# Patient Record
Sex: MALE | Race: WHITE | HISPANIC OR LATINO
[De-identification: names, ages, dates, MRNs, and addresses within clinical notes are randomized per-mention and may not be internally consistent; named-entity substitution may affect disease eponyms.]

---

## 2017-02-22 ENCOUNTER — RESULT REVIEW (OUTPATIENT)
Age: 47
End: 2017-02-22

## 2018-04-10 ENCOUNTER — TRANSCRIPTION ENCOUNTER (OUTPATIENT)
Age: 48
End: 2018-04-10

## 2018-04-11 ENCOUNTER — RECORD ABSTRACTING (OUTPATIENT)
Age: 48
End: 2018-04-11

## 2018-04-11 DIAGNOSIS — I25.2 OLD MYOCARDIAL INFARCTION: ICD-10-CM

## 2018-04-11 DIAGNOSIS — Z87.09 PERSONAL HISTORY OF OTHER DISEASES OF THE RESPIRATORY SYSTEM: ICD-10-CM

## 2018-04-11 DIAGNOSIS — Z78.9 OTHER SPECIFIED HEALTH STATUS: ICD-10-CM

## 2018-04-11 RX ORDER — UBIDECARENONE/VIT E ACET 100MG-5
CAPSULE ORAL
Refills: 0 | Status: ACTIVE | COMMUNITY

## 2018-04-11 RX ORDER — ATORVASTATIN CALCIUM 20 MG/1
20 TABLET, FILM COATED ORAL
Qty: 90 | Refills: 1 | Status: ACTIVE | COMMUNITY

## 2018-04-12 ENCOUNTER — APPOINTMENT (OUTPATIENT)
Dept: CARDIOLOGY | Facility: CLINIC | Age: 48
End: 2018-04-12
Payer: COMMERCIAL

## 2018-04-12 VITALS
BODY MASS INDEX: 34.82 KG/M2 | SYSTOLIC BLOOD PRESSURE: 138 MMHG | WEIGHT: 280 LBS | HEIGHT: 75 IN | HEART RATE: 81 BPM | DIASTOLIC BLOOD PRESSURE: 85 MMHG | RESPIRATION RATE: 18 BRPM

## 2018-04-12 PROCEDURE — 99214 OFFICE O/P EST MOD 30 MIN: CPT

## 2018-04-12 PROCEDURE — 93000 ELECTROCARDIOGRAM COMPLETE: CPT

## 2018-05-01 ENCOUNTER — APPOINTMENT (OUTPATIENT)
Dept: CARDIOLOGY | Facility: CLINIC | Age: 48
End: 2018-05-01
Payer: COMMERCIAL

## 2018-05-01 PROCEDURE — 93015 CV STRESS TEST SUPVJ I&R: CPT

## 2018-05-01 PROCEDURE — 78452 HT MUSCLE IMAGE SPECT MULT: CPT

## 2018-05-01 PROCEDURE — A9500: CPT

## 2018-05-01 RX ORDER — KIT FOR THE PREPARATION OF TECHNETIUM TC99M SESTAMIBI 1 MG/5ML
INJECTION, POWDER, LYOPHILIZED, FOR SOLUTION PARENTERAL
Refills: 0 | Status: COMPLETED | OUTPATIENT
Start: 2018-05-01

## 2018-05-01 RX ORDER — REGADENOSON 0.08 MG/ML
0.4 INJECTION, SOLUTION INTRAVENOUS
Qty: 1 | Refills: 0 | Status: COMPLETED | OUTPATIENT
Start: 2018-05-01

## 2018-05-01 RX ADMIN — REGADENOSON 0 MG/5ML: 0.08 INJECTION, SOLUTION INTRAVENOUS at 00:00

## 2018-05-01 RX ADMIN — KIT FOR THE PREPARATION OF TECHNETIUM TC99M SESTAMIBI 0: 1 INJECTION, POWDER, LYOPHILIZED, FOR SOLUTION PARENTERAL at 00:00

## 2018-05-07 ENCOUNTER — APPOINTMENT (OUTPATIENT)
Dept: CARDIOLOGY | Facility: CLINIC | Age: 48
End: 2018-05-07
Payer: COMMERCIAL

## 2018-05-07 PROCEDURE — ZZZZZ: CPT

## 2018-05-08 ENCOUNTER — OUTPATIENT (OUTPATIENT)
Dept: OUTPATIENT SERVICES | Facility: HOSPITAL | Age: 48
LOS: 1 days | Discharge: HOME | End: 2018-05-08

## 2018-05-08 ENCOUNTER — TRANSCRIPTION ENCOUNTER (OUTPATIENT)
Age: 48
End: 2018-05-08

## 2018-05-08 VITALS
WEIGHT: 285.28 LBS | SYSTOLIC BLOOD PRESSURE: 130 MMHG | TEMPERATURE: 98 F | RESPIRATION RATE: 18 BRPM | OXYGEN SATURATION: 97 % | HEART RATE: 67 BPM | HEIGHT: 75 IN | DIASTOLIC BLOOD PRESSURE: 87 MMHG

## 2018-05-08 DIAGNOSIS — E11.9 TYPE 2 DIABETES MELLITUS WITHOUT COMPLICATIONS: ICD-10-CM

## 2018-05-08 DIAGNOSIS — Z98.890 OTHER SPECIFIED POSTPROCEDURAL STATES: Chronic | ICD-10-CM

## 2018-05-08 DIAGNOSIS — M16.11 UNILATERAL PRIMARY OSTEOARTHRITIS, RIGHT HIP: ICD-10-CM

## 2018-05-08 DIAGNOSIS — I10 ESSENTIAL (PRIMARY) HYPERTENSION: ICD-10-CM

## 2018-05-08 DIAGNOSIS — G47.30 SLEEP APNEA, UNSPECIFIED: ICD-10-CM

## 2018-05-08 DIAGNOSIS — M25.559 PAIN IN UNSPECIFIED HIP: ICD-10-CM

## 2018-05-08 DIAGNOSIS — Z96.642 PRESENCE OF LEFT ARTIFICIAL HIP JOINT: Chronic | ICD-10-CM

## 2018-05-08 DIAGNOSIS — Z01.818 ENCOUNTER FOR OTHER PREPROCEDURAL EXAMINATION: ICD-10-CM

## 2018-05-08 DIAGNOSIS — Z98.84 BARIATRIC SURGERY STATUS: Chronic | ICD-10-CM

## 2018-05-08 DIAGNOSIS — Z96.649 PRESENCE OF UNSPECIFIED ARTIFICIAL HIP JOINT: Chronic | ICD-10-CM

## 2018-05-08 DIAGNOSIS — E78.5 HYPERLIPIDEMIA, UNSPECIFIED: ICD-10-CM

## 2018-05-08 LAB
ANION GAP SERPL CALC-SCNC: 8 MMOL/L — SIGNIFICANT CHANGE UP (ref 5–17)
APTT BLD: 32.9 SEC — SIGNIFICANT CHANGE UP (ref 27.5–37.4)
BASOPHILS # BLD AUTO: 0.05 K/UL — SIGNIFICANT CHANGE UP (ref 0–0.2)
BASOPHILS NFR BLD AUTO: 1 % — SIGNIFICANT CHANGE UP (ref 0–2)
BUN SERPL-MCNC: 14 MG/DL — SIGNIFICANT CHANGE UP (ref 7–23)
CALCIUM SERPL-MCNC: 8.9 MG/DL — SIGNIFICANT CHANGE UP (ref 8.5–10.1)
CHLORIDE SERPL-SCNC: 108 MMOL/L — SIGNIFICANT CHANGE UP (ref 96–108)
CO2 SERPL-SCNC: 25 MMOL/L — SIGNIFICANT CHANGE UP (ref 22–31)
CREAT SERPL-MCNC: 0.61 MG/DL — SIGNIFICANT CHANGE UP (ref 0.5–1.3)
EOSINOPHIL # BLD AUTO: 0.3 K/UL — SIGNIFICANT CHANGE UP (ref 0–0.5)
EOSINOPHIL NFR BLD AUTO: 5.7 % — SIGNIFICANT CHANGE UP (ref 0–6)
GLUCOSE SERPL-MCNC: 117 MG/DL — HIGH (ref 70–99)
HBA1C BLD-MCNC: 6.6 % — HIGH (ref 4–5.6)
HCT VFR BLD CALC: 43 % — SIGNIFICANT CHANGE UP (ref 39–50)
HGB BLD-MCNC: 14.6 G/DL — SIGNIFICANT CHANGE UP (ref 13–17)
IMM GRANULOCYTES NFR BLD AUTO: 0.2 % — SIGNIFICANT CHANGE UP (ref 0–1.5)
INR BLD: 1.1 RATIO — SIGNIFICANT CHANGE UP (ref 0.88–1.16)
LYMPHOCYTES # BLD AUTO: 1.57 K/UL — SIGNIFICANT CHANGE UP (ref 1–3.3)
LYMPHOCYTES # BLD AUTO: 30.1 % — SIGNIFICANT CHANGE UP (ref 13–44)
MCHC RBC-ENTMCNC: 28.3 PG — SIGNIFICANT CHANGE UP (ref 27–34)
MCHC RBC-ENTMCNC: 34 GM/DL — SIGNIFICANT CHANGE UP (ref 32–36)
MCV RBC AUTO: 83.5 FL — SIGNIFICANT CHANGE UP (ref 80–100)
MONOCYTES # BLD AUTO: 0.39 K/UL — SIGNIFICANT CHANGE UP (ref 0–0.9)
MONOCYTES NFR BLD AUTO: 7.5 % — SIGNIFICANT CHANGE UP (ref 2–14)
MRSA PCR RESULT.: SIGNIFICANT CHANGE UP
NEUTROPHILS # BLD AUTO: 2.9 K/UL — SIGNIFICANT CHANGE UP (ref 1.8–7.4)
NEUTROPHILS NFR BLD AUTO: 55.5 % — SIGNIFICANT CHANGE UP (ref 43–77)
PLATELET # BLD AUTO: 272 K/UL — SIGNIFICANT CHANGE UP (ref 150–400)
POTASSIUM SERPL-MCNC: 3.7 MMOL/L — SIGNIFICANT CHANGE UP (ref 3.5–5.3)
POTASSIUM SERPL-SCNC: 3.7 MMOL/L — SIGNIFICANT CHANGE UP (ref 3.5–5.3)
PROTHROM AB SERPL-ACNC: 12 SEC — SIGNIFICANT CHANGE UP (ref 9.8–12.7)
RBC # BLD: 5.15 M/UL — SIGNIFICANT CHANGE UP (ref 4.2–5.8)
RBC # FLD: 13.4 % — SIGNIFICANT CHANGE UP (ref 10.3–14.5)
S AUREUS DNA NOSE QL NAA+PROBE: SIGNIFICANT CHANGE UP
SODIUM SERPL-SCNC: 141 MMOL/L — SIGNIFICANT CHANGE UP (ref 135–145)
WBC # BLD: 5.22 K/UL — SIGNIFICANT CHANGE UP (ref 3.8–10.5)
WBC # FLD AUTO: 5.22 K/UL — SIGNIFICANT CHANGE UP (ref 3.8–10.5)

## 2018-05-08 NOTE — OCCUPATIONAL THERAPY INITIAL EVALUATION ADULT - ADDITIONAL COMMENTS
Pt is functioning in his  roles, self sufficient, driving  & ambulating independently without any assistive devices. Pt c/o  6/10 in  right hip and this increases with movements, and standing tasks. Pt takes Aleve PRN  for pain relief. Pt has no DME at home. Pt scores 90% of patient specific scale

## 2018-05-08 NOTE — PHYSICAL THERAPY INITIAL EVALUATION ADULT - CRITERIA FOR SKILLED THERAPEUTIC INTERVENTIONS
risk reduction/prevention/:,:,:/anticipated discharge recommendation/rehab potential/impairments found/functional limitations in following categories/anticipated equipment needs at discharge

## 2018-05-08 NOTE — OCCUPATIONAL THERAPY INITIAL EVALUATION ADULT - SOCIAL CONCERNS
Complex psychosocial needs/coping issues/Pt voiced concerns about his recovery at home. Pt  plans to sat at his girlfriend's home . Pt has the support of his girlfriend  to assist him after discharge home post-operatively.

## 2018-05-08 NOTE — PHYSICAL THERAPY INITIAL EVALUATION ADULT - PLANNED THERAPY INTERVENTIONS, PT EVAL
neuromuscular re-education/postural re-education/strengthening/transfer training/balance training/stretching/gait training/manual therapy techniques/ROM

## 2018-05-08 NOTE — OCCUPATIONAL THERAPY INITIAL EVALUATION ADULT - RANGE OF MOTION EXAMINATION, LOWER EXTREMITY
Left LE Active ROM was WNL  (within normal limits)/Right LE Active Assistive ROM was WFL  (within functional limits)/Left LE Passive ROM was WNL (within normal limits)/Right LE Passive ROM was WFL  (within functional limits)

## 2018-05-08 NOTE — H&P PST ADULT - PMH
Diabetes    HTN (hypertension)    Hyperlipidemia Diabetes    HTN (hypertension)    Hyperlipidemia    MI, old  1998  Sleep apnea  USES CPAP

## 2018-05-08 NOTE — PATIENT PROFILE ADULT. - CENTRAL VENOUS CATHETER
Subjective:      Roxy Salinas is a 47 y.o. female who presents for an acute visit with the following chief complaint. Chief Complaint   Patient presents with    Cold Symptoms     diarrhea, productive cough, sore throat X 4 days      Onset Saturday     Upper Respiratory Infection  Patient complains of symptoms of diarrhea, productive cough, sore throat. Onset 4 days ago, symptoms unchanged since this time. Associated symptoms include malaise, nasal congestion. Cough is occasionally productive with clear sputum. She denies SOB or wheezing. She has a history of asthma but has not needed her albuterol inhaler. She is drinking plenty of fluids and tolerating PO intake. Evaluation to date: none. Treatment to date: OTC decongestants, cough suppressants, hot tea. Sick contacts include co-workers. Current Outpatient Prescriptions   Medication Sig Dispense Refill    amLODIPine (NORVASC) 5 mg tablet Take 1 Tab by mouth daily. 90 Tab 0    predniSONE (DELTASONE) 20 mg tablet Take 1 Tab by mouth daily (with breakfast). 20 Tab 0    CYANOCOBALAMIN, VITAMIN B-12, (LIQUID B 12 PO) Take  by mouth.  zinc 15 mg tab Take 15 mg by mouth daily. 90 Tab 3    cetirizine (ALLER-FERNANDA) 10 mg tablet Take 1 Tab by mouth daily. 90 Tab 1    acetaminophen (TYLENOL) 325 mg tablet Take  by mouth every four (4) hours as needed for Pain.  ibuprofen (MOTRIN) 800 mg tablet Take 1 Tab by mouth every eight (8) hours as needed for Pain. 30 Tab 0    coenzyme q10 (CO Q-10) 10 mg cap Take  by mouth.  fluticasone-vilanterol (BREO ELLIPTA) 100-25 mcg/dose inhaler Take 1 Puff by inhalation daily. 1 Inhaler 5    albuterol (PROVENTIL HFA, VENTOLIN HFA, PROAIR HFA) 90 mcg/actuation inhaler Take 1-2 Puffs by inhalation every four (4) hours as needed for Wheezing or Shortness of Breath. 1 Inhaler 2    omeprazole (PRILOSEC OTC) 20 mg tablet Take 20 mg by mouth daily.       EPIPEN 2-WAYNE 0.3 mg/0.3 mL (1:1,000) injection 0.3 mL by IntraMUSCular route once as needed for up to 1 dose. 2 Each 3    aspirin 81 mg tablet Take 81 mg by mouth daily.  ascorbic acid (VITAMIN C) 500 mg tablet Take 500 mg by mouth daily.  Cholecalciferol, Vitamin D3, (VITAMIN D3) 1,000 unit Cap Take 1 capsule by mouth daily.  multivitamin (ONE A DAY) tablet Take 1 Tab by mouth daily. Allergies   Allergen Reactions    Latex Swelling     Swelling in face when latex gloves were used at dentist office    Other Food Itching     Peas, raw broccoli, raw cauliflower, croutons    Hydrocodone-Acetaminophen Itching    Ultracet [Tramadol-Acetaminophen] Swelling     Facial and mouth    Biaxin [Clarithromycin] Itching and Nausea Only    Carrot Itching    Codeine Itching    Dilaudid [Hydromorphone] Itching and Nausea Only    Flexeril [Cyclobenzaprine] Itching    Naproxen Rash    Nucynta [Tapentadol] Itching    Oxycodone Swelling     Swelling of hands    Pcn [Penicillins] Rash    Peanut Shortness of Breath and Swelling     All nuts    Red Dye Itching and Swelling     #9    Shellfish Derived Swelling     shrimp    Strawberry Itching    Tramadol Hives       ROS:   Complete review of systems was reviewed with pertinent information listed in HPI. Review of Systems   Constitutional: Positive for malaise/fatigue. Negative for chills, diaphoresis and fever. HENT: Positive for congestion and sore throat. Negative for sinus pain. Respiratory: Positive for cough and sputum production. Negative for hemoptysis, shortness of breath and wheezing. Cardiovascular: Negative for chest pain. Gastrointestinal: Positive for diarrhea. Negative for abdominal pain, nausea and vomiting. Able to eat and drink. Poor appetitie   Genitourinary: Negative for dysuria. Musculoskeletal: Negative for joint pain and myalgias. Skin: Negative for rash. Neurological: Negative for dizziness and headaches.        Objective:     Visit Vitals    /85 (BP 1 Location: Left arm, BP Patient Position: Sitting)    Pulse 85    Temp 98.5 °F (36.9 °C) (Oral)    Resp 18    Ht 5' 3\" (1.6 m)    Wt 242 lb (109.8 kg)    SpO2 97%    BMI 42.87 kg/m2       Vitals and Nurse Documentation reviewed. Physical Exam   Constitutional: She is oriented to person, place, and time and well-developed, well-nourished, and in no distress. She does not have a sickly appearance. No distress. HENT:   Head: Normocephalic and atraumatic. Right Ear: Tympanic membrane is not erythematous and not bulging. No decreased hearing is noted. Left Ear: Tympanic membrane is not erythematous and not bulging. No decreased hearing is noted. Nose: Mucosal edema and rhinorrhea present. Right sinus exhibits no maxillary sinus tenderness and no frontal sinus tenderness. Left sinus exhibits no maxillary sinus tenderness and no frontal sinus tenderness. Mouth/Throat: Uvula is midline. Posterior oropharyngeal erythema present. No oropharyngeal exudate, posterior oropharyngeal edema or tonsillar abscesses. Eyes: Conjunctivae and lids are normal. Pupils are equal, round, and reactive to light. Right conjunctiva is not injected. Left conjunctiva is not injected. Neck: Normal range of motion. Neck supple. No tracheal deviation present. No thyroid mass and no thyromegaly present. Cardiovascular: Normal rate, regular rhythm, S1 normal, S2 normal, normal heart sounds and intact distal pulses. Exam reveals no gallop and no friction rub. No murmur heard. Pulmonary/Chest: Effort normal and breath sounds normal. No respiratory distress. She has no wheezes. She has no rales. Abdominal: Soft. Bowel sounds are normal. She exhibits no distension. There is no tenderness. Lymphadenopathy:     She has no cervical adenopathy. Neurological: She is alert and oriented to person, place, and time. Gait normal.   Skin: Skin is warm and dry. She is not diaphoretic. No erythema.    Psychiatric: Mood and affect normal.   Nursing note and vitals reviewed. Assessment/Plan:     Diagnoses and all orders for this visit:    1. Viral URI with cough: Resolving, day 4 of URI symptoms without SOB or wheezing. Continue with symptomatic care including rest, lots of po fluids, Mucinex, OTC cough suppressant, tylenol or ibuprofen for sore throat. We discussed anticipated course of viral URI and she will call or return to clinic if she does not have improvement in 3-5 days or if symptoms worsen. She is due for follow-up with PCP Dr. Demetrice Sanz. I agree with the above documentation and the patient was seen in conjunction with the NP. Amy Carson NP      Discussed expected course/resolution/complications of diagnosis in detail with patient.    Medication risks/benefits/costs/interactions/alternatives discussed with patient.    Pt was given an after visit summary which includes diagnoses, current medications & vitals.    Pt expressed understanding with the diagnosis and plan    Follow-up Disposition:  Return for Routine Physical Exam-schedule. no

## 2018-05-08 NOTE — PHYSICAL THERAPY INITIAL EVALUATION ADULT - MODIFIED CLINICAL TEST OF SENSORY INTEGRATION IN BALANCE TEST
5x Sit to Stand Test = unable due to pain and weakness, indicating significant impairment c functional mobility & strength  ; 2 Minute Walk Test = 360 feet without devices or rest stops, Pain rating 5/10, measured for baseline recording

## 2018-05-08 NOTE — H&P PST ADULT - PSH
S/P gastric bypass  2004  S/P hip replacement, left  2014  S/P lumbar laminectomy  2000 S/P gastric bypass  2004  S/P hip hemiarthroplasty  left hip pins then removal of pins  S/P hip replacement, left  2014  S/P lumbar laminectomy  2000

## 2018-05-08 NOTE — OCCUPATIONAL THERAPY INITIAL EVALUATION ADULT - COORDINATION ASSESSED, REHAB EVAL
intact in BUE/LLE, diminished in RLE/heel to shin/finger to nose intact in BUE/LLE, diminished in RLE/finger to nose/heel to shin

## 2018-05-08 NOTE — PHYSICAL THERAPY INITIAL EVALUATION ADULT - GAIT DEVIATIONS NOTED, PT EVAL
footdrop/decreased elodia/decreased step length/decreased stride length/mild right foot drop noted with increased walking distance; (+) limp to right

## 2018-05-08 NOTE — PHYSICAL THERAPY INITIAL EVALUATION ADULT - PERTINENT HX OF CURRENT PROBLEM, REHAB EVAL
Patient is for elective right total hip arthroplasty (likely posterior approach) at a later date from now. Significant history of spinal laminectomy in 2000 - resulting to mild right foot drop and sensation around right ankle.

## 2018-05-08 NOTE — OCCUPATIONAL THERAPY INITIAL EVALUATION ADULT - PRECAUTIONS/LIMITATIONS, REHAB EVAL
fall precautions/Pt has a history of multiple comorbidities. Pt has  right foot drop  from s/p  laminectomy  in  2000

## 2018-05-08 NOTE — PHYSICAL THERAPY INITIAL EVALUATION ADULT - ADDITIONAL COMMENTS
Per patient, will be staying at girlfriend's home post-op. No stairs to enter, all amenities in one level. Girlfriend will be available for support post-op. Girlfriend's home has a shower/tub combo bathroom with regular height toilet seat. Pain aggravating factors include sudden turns and movements, woken sometimes with pain to hip; standing for long periods. Pain easing factors include resting, Alleve. Denies use of mobility devices prior to admission, other than right foot AFO (not worn now as poorly fitting). No adaptive devices at home.

## 2018-05-14 ENCOUNTER — APPOINTMENT (OUTPATIENT)
Dept: CARDIOLOGY | Facility: CLINIC | Age: 48
End: 2018-05-14
Payer: COMMERCIAL

## 2018-05-14 PROCEDURE — 93306 TTE W/DOPPLER COMPLETE: CPT

## 2018-05-22 ENCOUNTER — TRANSCRIPTION ENCOUNTER (OUTPATIENT)
Age: 48
End: 2018-05-22

## 2018-05-22 RX ORDER — SODIUM CHLORIDE 9 MG/ML
3 INJECTION INTRAMUSCULAR; INTRAVENOUS; SUBCUTANEOUS EVERY 8 HOURS
Qty: 0 | Refills: 0 | Status: DISCONTINUED | OUTPATIENT
Start: 2018-05-23 | End: 2018-05-23

## 2018-05-22 RX ORDER — OXYCODONE HYDROCHLORIDE 5 MG/1
10 TABLET ORAL ONCE
Qty: 0 | Refills: 0 | Status: DISCONTINUED | OUTPATIENT
Start: 2018-05-23 | End: 2018-05-23

## 2018-05-22 RX ORDER — ACETAMINOPHEN 500 MG
650 TABLET ORAL ONCE
Qty: 0 | Refills: 0 | Status: COMPLETED | OUTPATIENT
Start: 2018-05-23 | End: 2018-05-23

## 2018-05-23 ENCOUNTER — TRANSCRIPTION ENCOUNTER (OUTPATIENT)
Age: 48
End: 2018-05-23

## 2018-05-23 ENCOUNTER — RESULT REVIEW (OUTPATIENT)
Age: 48
End: 2018-05-23

## 2018-05-23 ENCOUNTER — INPATIENT (INPATIENT)
Facility: HOSPITAL | Age: 48
LOS: 0 days | Discharge: HOME HEALTH | End: 2018-05-24
Attending: ORTHOPAEDIC SURGERY | Admitting: ORTHOPAEDIC SURGERY
Payer: COMMERCIAL

## 2018-05-23 VITALS
RESPIRATION RATE: 18 BRPM | WEIGHT: 279.99 LBS | HEART RATE: 74 BPM | DIASTOLIC BLOOD PRESSURE: 87 MMHG | SYSTOLIC BLOOD PRESSURE: 133 MMHG | HEIGHT: 75 IN | TEMPERATURE: 98 F

## 2018-05-23 DIAGNOSIS — Z98.84 BARIATRIC SURGERY STATUS: Chronic | ICD-10-CM

## 2018-05-23 DIAGNOSIS — I25.10 ATHEROSCLEROTIC HEART DISEASE OF NATIVE CORONARY ARTERY WITHOUT ANGINA PECTORIS: ICD-10-CM

## 2018-05-23 DIAGNOSIS — Z96.649 PRESENCE OF UNSPECIFIED ARTIFICIAL HIP JOINT: Chronic | ICD-10-CM

## 2018-05-23 DIAGNOSIS — G47.33 OBSTRUCTIVE SLEEP APNEA (ADULT) (PEDIATRIC): ICD-10-CM

## 2018-05-23 DIAGNOSIS — I10 ESSENTIAL (PRIMARY) HYPERTENSION: ICD-10-CM

## 2018-05-23 DIAGNOSIS — Z96.642 PRESENCE OF LEFT ARTIFICIAL HIP JOINT: Chronic | ICD-10-CM

## 2018-05-23 DIAGNOSIS — E11.65 TYPE 2 DIABETES MELLITUS WITH HYPERGLYCEMIA: ICD-10-CM

## 2018-05-23 DIAGNOSIS — Z98.890 OTHER SPECIFIED POSTPROCEDURAL STATES: Chronic | ICD-10-CM

## 2018-05-23 DIAGNOSIS — M16.11 UNILATERAL PRIMARY OSTEOARTHRITIS, RIGHT HIP: ICD-10-CM

## 2018-05-23 LAB
ANION GAP SERPL CALC-SCNC: 3 MMOL/L — LOW (ref 5–17)
BUN SERPL-MCNC: 16 MG/DL — SIGNIFICANT CHANGE UP (ref 7–23)
CALCIUM SERPL-MCNC: 8.2 MG/DL — LOW (ref 8.5–10.1)
CHLORIDE SERPL-SCNC: 108 MMOL/L — SIGNIFICANT CHANGE UP (ref 96–108)
CO2 SERPL-SCNC: 27 MMOL/L — SIGNIFICANT CHANGE UP (ref 22–31)
CREAT SERPL-MCNC: 0.85 MG/DL — SIGNIFICANT CHANGE UP (ref 0.5–1.3)
GLUCOSE BLDC GLUCOMTR-MCNC: 188 MG/DL — HIGH (ref 70–99)
GLUCOSE SERPL-MCNC: 151 MG/DL — HIGH (ref 70–99)
HCT VFR BLD CALC: 39.8 % — SIGNIFICANT CHANGE UP (ref 39–50)
HGB BLD-MCNC: 13.4 G/DL — SIGNIFICANT CHANGE UP (ref 13–17)
MCHC RBC-ENTMCNC: 29.1 PG — SIGNIFICANT CHANGE UP (ref 27–34)
MCHC RBC-ENTMCNC: 33.7 GM/DL — SIGNIFICANT CHANGE UP (ref 32–36)
MCV RBC AUTO: 86.3 FL — SIGNIFICANT CHANGE UP (ref 80–100)
NRBC # BLD: 0 /100 WBCS — SIGNIFICANT CHANGE UP (ref 0–0)
PLATELET # BLD AUTO: 217 K/UL — SIGNIFICANT CHANGE UP (ref 150–400)
POTASSIUM SERPL-MCNC: 5.2 MMOL/L — SIGNIFICANT CHANGE UP (ref 3.5–5.3)
POTASSIUM SERPL-SCNC: 5.2 MMOL/L — SIGNIFICANT CHANGE UP (ref 3.5–5.3)
RBC # BLD: 4.61 M/UL — SIGNIFICANT CHANGE UP (ref 4.2–5.8)
RBC # FLD: 13.3 % — SIGNIFICANT CHANGE UP (ref 10.3–14.5)
SODIUM SERPL-SCNC: 138 MMOL/L — SIGNIFICANT CHANGE UP (ref 135–145)
WBC # BLD: 12.96 K/UL — HIGH (ref 3.8–10.5)
WBC # FLD AUTO: 12.96 K/UL — HIGH (ref 3.8–10.5)

## 2018-05-23 PROCEDURE — 72170 X-RAY EXAM OF PELVIS: CPT | Mod: 26

## 2018-05-23 PROCEDURE — 88311 DECALCIFY TISSUE: CPT | Mod: 26

## 2018-05-23 PROCEDURE — 88305 TISSUE EXAM BY PATHOLOGIST: CPT | Mod: 26

## 2018-05-23 PROCEDURE — 99223 1ST HOSP IP/OBS HIGH 75: CPT

## 2018-05-23 RX ORDER — OXYCODONE HYDROCHLORIDE 5 MG/1
5 TABLET ORAL EVERY 4 HOURS
Qty: 0 | Refills: 0 | Status: DISCONTINUED | OUTPATIENT
Start: 2018-05-23 | End: 2018-05-24

## 2018-05-23 RX ORDER — CELECOXIB 200 MG/1
200 CAPSULE ORAL
Qty: 0 | Refills: 0 | Status: DISCONTINUED | OUTPATIENT
Start: 2018-05-24 | End: 2018-05-24

## 2018-05-23 RX ORDER — CEFAZOLIN SODIUM 1 G
3000 VIAL (EA) INJECTION EVERY 8 HOURS
Qty: 0 | Refills: 0 | Status: COMPLETED | OUTPATIENT
Start: 2018-05-23 | End: 2018-05-23

## 2018-05-23 RX ORDER — AMLODIPINE BESYLATE 2.5 MG/1
5 TABLET ORAL DAILY
Qty: 0 | Refills: 0 | Status: DISCONTINUED | OUTPATIENT
Start: 2018-05-23 | End: 2018-05-23

## 2018-05-23 RX ORDER — HYDROMORPHONE HYDROCHLORIDE 2 MG/ML
1 INJECTION INTRAMUSCULAR; INTRAVENOUS; SUBCUTANEOUS
Qty: 0 | Refills: 0 | Status: DISCONTINUED | OUTPATIENT
Start: 2018-05-23 | End: 2018-05-23

## 2018-05-23 RX ORDER — ATORVASTATIN CALCIUM 80 MG/1
1 TABLET, FILM COATED ORAL
Qty: 0 | Refills: 0 | COMMUNITY

## 2018-05-23 RX ORDER — DOCUSATE SODIUM 100 MG
100 CAPSULE ORAL THREE TIMES A DAY
Qty: 0 | Refills: 0 | Status: DISCONTINUED | OUTPATIENT
Start: 2018-05-23 | End: 2018-05-24

## 2018-05-23 RX ORDER — AMLODIPINE BESYLATE 2.5 MG/1
1 TABLET ORAL
Qty: 0 | Refills: 0 | COMMUNITY

## 2018-05-23 RX ORDER — ZOLPIDEM TARTRATE 10 MG/1
5 TABLET ORAL AT BEDTIME
Qty: 0 | Refills: 0 | Status: DISCONTINUED | OUTPATIENT
Start: 2018-05-23 | End: 2018-05-24

## 2018-05-23 RX ORDER — VITAMIN E 100 UNIT
1 CAPSULE ORAL
Qty: 0 | Refills: 0 | COMMUNITY

## 2018-05-23 RX ORDER — ONDANSETRON 8 MG/1
4 TABLET, FILM COATED ORAL EVERY 6 HOURS
Qty: 0 | Refills: 0 | Status: DISCONTINUED | OUTPATIENT
Start: 2018-05-23 | End: 2018-05-24

## 2018-05-23 RX ORDER — METFORMIN HYDROCHLORIDE 850 MG/1
1 TABLET ORAL
Qty: 0 | Refills: 0 | COMMUNITY

## 2018-05-23 RX ORDER — DIPHENHYDRAMINE HCL 50 MG
25 CAPSULE ORAL AT BEDTIME
Qty: 0 | Refills: 0 | Status: DISCONTINUED | OUTPATIENT
Start: 2018-05-23 | End: 2018-05-24

## 2018-05-23 RX ORDER — ACETAMINOPHEN 500 MG
975 TABLET ORAL EVERY 8 HOURS
Qty: 0 | Refills: 0 | Status: COMPLETED | OUTPATIENT
Start: 2018-05-23 | End: 2018-05-24

## 2018-05-23 RX ORDER — FERROUS SULFATE 325(65) MG
325 TABLET ORAL
Qty: 0 | Refills: 0 | Status: DISCONTINUED | OUTPATIENT
Start: 2018-05-23 | End: 2018-05-24

## 2018-05-23 RX ORDER — CELECOXIB 200 MG/1
200 CAPSULE ORAL ONCE
Qty: 0 | Refills: 0 | Status: COMPLETED | OUTPATIENT
Start: 2018-05-23 | End: 2018-05-23

## 2018-05-23 RX ORDER — HYDROMORPHONE HYDROCHLORIDE 2 MG/ML
1 INJECTION INTRAMUSCULAR; INTRAVENOUS; SUBCUTANEOUS EVERY 4 HOURS
Qty: 0 | Refills: 0 | Status: DISCONTINUED | OUTPATIENT
Start: 2018-05-23 | End: 2018-05-24

## 2018-05-23 RX ORDER — LISINOPRIL 2.5 MG/1
1 TABLET ORAL
Qty: 0 | Refills: 0 | COMMUNITY

## 2018-05-23 RX ORDER — ASPIRIN/CALCIUM CARB/MAGNESIUM 324 MG
325 TABLET ORAL
Qty: 0 | Refills: 0 | Status: DISCONTINUED | OUTPATIENT
Start: 2018-05-23 | End: 2018-05-24

## 2018-05-23 RX ORDER — METFORMIN HYDROCHLORIDE 850 MG/1
500 TABLET ORAL DAILY
Qty: 0 | Refills: 0 | Status: DISCONTINUED | OUTPATIENT
Start: 2018-05-23 | End: 2018-05-23

## 2018-05-23 RX ORDER — ASCORBIC ACID 60 MG
500 TABLET,CHEWABLE ORAL
Qty: 0 | Refills: 0 | Status: DISCONTINUED | OUTPATIENT
Start: 2018-05-23 | End: 2018-05-24

## 2018-05-23 RX ORDER — TRANEXAMIC ACID 100 MG/ML
1000 INJECTION, SOLUTION INTRAVENOUS ONCE
Qty: 0 | Refills: 0 | Status: COMPLETED | OUTPATIENT
Start: 2018-05-23 | End: 2018-05-23

## 2018-05-23 RX ORDER — DEXAMETHASONE 0.5 MG/5ML
10 ELIXIR ORAL ONCE
Qty: 0 | Refills: 0 | Status: COMPLETED | OUTPATIENT
Start: 2018-05-24 | End: 2018-05-24

## 2018-05-23 RX ORDER — SODIUM CHLORIDE 9 MG/ML
1000 INJECTION, SOLUTION INTRAVENOUS
Qty: 0 | Refills: 0 | Status: DISCONTINUED | OUTPATIENT
Start: 2018-05-23 | End: 2018-05-23

## 2018-05-23 RX ORDER — LISINOPRIL 2.5 MG/1
10 TABLET ORAL DAILY
Qty: 0 | Refills: 0 | Status: DISCONTINUED | OUTPATIENT
Start: 2018-05-23 | End: 2018-05-23

## 2018-05-23 RX ORDER — CETIRIZINE HYDROCHLORIDE 10 MG/1
1 TABLET ORAL
Qty: 0 | Refills: 0 | COMMUNITY

## 2018-05-23 RX ORDER — LORATADINE 10 MG/1
10 TABLET ORAL DAILY
Qty: 0 | Refills: 0 | Status: DISCONTINUED | OUTPATIENT
Start: 2018-05-23 | End: 2018-05-23

## 2018-05-23 RX ORDER — PANTOPRAZOLE SODIUM 20 MG/1
40 TABLET, DELAYED RELEASE ORAL DAILY
Qty: 0 | Refills: 0 | Status: DISCONTINUED | OUTPATIENT
Start: 2018-05-23 | End: 2018-05-24

## 2018-05-23 RX ORDER — SODIUM CHLORIDE 9 MG/ML
1000 INJECTION, SOLUTION INTRAVENOUS
Qty: 0 | Refills: 0 | Status: DISCONTINUED | OUTPATIENT
Start: 2018-05-23 | End: 2018-05-24

## 2018-05-23 RX ORDER — ATORVASTATIN CALCIUM 80 MG/1
20 TABLET, FILM COATED ORAL AT BEDTIME
Qty: 0 | Refills: 0 | Status: DISCONTINUED | OUTPATIENT
Start: 2018-05-23 | End: 2018-05-23

## 2018-05-23 RX ORDER — OMEGA-3 ACID ETHYL ESTERS 1 G
1 CAPSULE ORAL
Qty: 0 | Refills: 0 | COMMUNITY

## 2018-05-23 RX ORDER — POLYETHYLENE GLYCOL 3350 17 G/17G
17 POWDER, FOR SOLUTION ORAL DAILY
Qty: 0 | Refills: 0 | Status: DISCONTINUED | OUTPATIENT
Start: 2018-05-23 | End: 2018-05-24

## 2018-05-23 RX ORDER — SENNA PLUS 8.6 MG/1
2 TABLET ORAL AT BEDTIME
Qty: 0 | Refills: 0 | Status: DISCONTINUED | OUTPATIENT
Start: 2018-05-23 | End: 2018-05-24

## 2018-05-23 RX ORDER — FOLIC ACID 0.8 MG
1 TABLET ORAL DAILY
Qty: 0 | Refills: 0 | Status: DISCONTINUED | OUTPATIENT
Start: 2018-05-23 | End: 2018-05-24

## 2018-05-23 RX ORDER — ACETAMINOPHEN 500 MG
975 TABLET ORAL EVERY 8 HOURS
Qty: 0 | Refills: 0 | Status: DISCONTINUED | OUTPATIENT
Start: 2018-05-23 | End: 2018-05-23

## 2018-05-23 RX ORDER — ACETAMINOPHEN 500 MG
1000 TABLET ORAL ONCE
Qty: 0 | Refills: 0 | Status: COMPLETED | OUTPATIENT
Start: 2018-05-23 | End: 2018-05-23

## 2018-05-23 RX ORDER — PREGABALIN 225 MG/1
1 CAPSULE ORAL
Qty: 0 | Refills: 0 | COMMUNITY

## 2018-05-23 RX ORDER — OXYCODONE HYDROCHLORIDE 5 MG/1
10 TABLET ORAL EVERY 4 HOURS
Qty: 0 | Refills: 0 | Status: DISCONTINUED | OUTPATIENT
Start: 2018-05-23 | End: 2018-05-24

## 2018-05-23 RX ADMIN — Medication 1 TABLET(S): at 16:28

## 2018-05-23 RX ADMIN — HYDROMORPHONE HYDROCHLORIDE 1 MILLIGRAM(S): 2 INJECTION INTRAMUSCULAR; INTRAVENOUS; SUBCUTANEOUS at 21:45

## 2018-05-23 RX ADMIN — Medication 200 MILLIGRAM(S): at 23:28

## 2018-05-23 RX ADMIN — PANTOPRAZOLE SODIUM 40 MILLIGRAM(S): 20 TABLET, DELAYED RELEASE ORAL at 16:28

## 2018-05-23 RX ADMIN — CELECOXIB 200 MILLIGRAM(S): 200 CAPSULE ORAL at 06:50

## 2018-05-23 RX ADMIN — HYDROMORPHONE HYDROCHLORIDE 1 MILLIGRAM(S): 2 INJECTION INTRAMUSCULAR; INTRAVENOUS; SUBCUTANEOUS at 21:30

## 2018-05-23 RX ADMIN — Medication 1000 MILLIGRAM(S): at 09:56

## 2018-05-23 RX ADMIN — SODIUM CHLORIDE 100 MILLILITER(S): 9 INJECTION, SOLUTION INTRAVENOUS at 09:56

## 2018-05-23 RX ADMIN — OXYCODONE HYDROCHLORIDE 10 MILLIGRAM(S): 5 TABLET ORAL at 06:50

## 2018-05-23 RX ADMIN — SODIUM CHLORIDE 3 MILLILITER(S): 9 INJECTION INTRAMUSCULAR; INTRAVENOUS; SUBCUTANEOUS at 06:37

## 2018-05-23 RX ADMIN — Medication 200 MILLIGRAM(S): at 16:30

## 2018-05-23 RX ADMIN — Medication 975 MILLIGRAM(S): at 21:30

## 2018-05-23 RX ADMIN — Medication 100 MILLIGRAM(S): at 21:30

## 2018-05-23 RX ADMIN — TRANEXAMIC ACID 220 MILLIGRAM(S): 100 INJECTION, SOLUTION INTRAVENOUS at 10:22

## 2018-05-23 RX ADMIN — OXYCODONE HYDROCHLORIDE 5 MILLIGRAM(S): 5 TABLET ORAL at 19:40

## 2018-05-23 RX ADMIN — SODIUM CHLORIDE 120 MILLILITER(S): 9 INJECTION, SOLUTION INTRAVENOUS at 21:30

## 2018-05-23 RX ADMIN — Medication 325 MILLIGRAM(S): at 17:21

## 2018-05-23 RX ADMIN — Medication 325 MILLIGRAM(S): at 16:29

## 2018-05-23 RX ADMIN — Medication 650 MILLIGRAM(S): at 06:50

## 2018-05-23 RX ADMIN — Medication 100 MILLIGRAM(S): at 16:37

## 2018-05-23 RX ADMIN — Medication 400 MILLIGRAM(S): at 09:55

## 2018-05-23 RX ADMIN — OXYCODONE HYDROCHLORIDE 5 MILLIGRAM(S): 5 TABLET ORAL at 22:30

## 2018-05-23 RX ADMIN — HYDROMORPHONE HYDROCHLORIDE 1 MILLIGRAM(S): 2 INJECTION INTRAMUSCULAR; INTRAVENOUS; SUBCUTANEOUS at 17:20

## 2018-05-23 RX ADMIN — OXYCODONE HYDROCHLORIDE 5 MILLIGRAM(S): 5 TABLET ORAL at 19:05

## 2018-05-23 RX ADMIN — Medication 975 MILLIGRAM(S): at 16:36

## 2018-05-23 RX ADMIN — Medication 1 MILLIGRAM(S): at 16:29

## 2018-05-23 RX ADMIN — OXYCODONE HYDROCHLORIDE 5 MILLIGRAM(S): 5 TABLET ORAL at 21:30

## 2018-05-23 RX ADMIN — Medication 500 MILLIGRAM(S): at 17:23

## 2018-05-23 RX ADMIN — HYDROMORPHONE HYDROCHLORIDE 1 MILLIGRAM(S): 2 INJECTION INTRAMUSCULAR; INTRAVENOUS; SUBCUTANEOUS at 16:27

## 2018-05-23 NOTE — PHYSICAL THERAPY INITIAL EVALUATION ADULT - GENERAL OBSERVATIONS, REHAB EVAL
Pt seen supine in bed, alert and Ox4, NAD, R hip c dressing intact, nasal cannula intact. Pt /73, HR 73, O2 on room air 96% on room air. Pt instructed on R posterior hip precautions, verbalized and abided by throughout

## 2018-05-23 NOTE — DISCHARGE NOTE ADULT - PAIN PRESENT
No Take your medications exactly as prescribed. Having your pain under control will help increase activity, improve deep breathing and coughing and prevent complications like pneumonia and blood clots in your legs. Some of the common side effects of pain medications are nausea, vomiting, itching, rash and upset stomach. Contact your doctor if you develop these or any other unusual systoms. Eat a diet rich in fiber and drink plenty of oral fluids. Use other pain management methods like, cold and warm applications, elevation of affected body part, listening to music, watching TV, yoga, etc.Do not take any other medications unless approved by your doctor. Do not drive, operate machinery, drink alcohol, or make any important decisions while taking narcotic pain medications. Store medication in its original bottle, in a locked cabinet away from the reach of children. Dispose of any unused and  medication safely./No

## 2018-05-23 NOTE — OCCUPATIONAL THERAPY INITIAL EVALUATION ADULT - ADDITIONAL COMMENTS
Per pre surgical testing, will be staying at girlfriend's home post-op. No stairs to enter, all amenities in one level. Girlfriend will be available for support post-op. Girlfriend's home has a shower/tub combo bathroom with regular height toilet seat. Denies use of mobility devices prior to admission, other than right foot AFO (not worn now as poorly fitting). No adaptive devices at home.    Pt performed lower body dressing with supervision with rolling walker (doffing/donning socks/shoes, and donning shorts, with use of reacher, long handled shoe horn, and sock aid. Pt performed functional mobility to bathroom toilet with rolling walker with supervision, performed toileting with supervision and returned to hip chair, demonstrating good sit to stand and stand to sit technique while maintaining posterior hip precautions.  Pt educated on hip precautions, demonstrating good understanding and good ADL management.

## 2018-05-23 NOTE — CONSULT NOTE ADULT - PROBLEM SELECTOR RECOMMENDATION 9
cont with pain management and bowel regimen  OT/PT  dvt ppx as per ortho  monitor h/h  incentive spirometer  zofran prn for n/v

## 2018-05-23 NOTE — ASU PREOP CHECKLIST - SELECT TESTS ORDERED
EKG/CBC/BMP/PT/PTT/INR/Type and Screen INR/PT/PTT/EKG/188/POCT Blood Glucose/BMP/CBC/Type and Screen

## 2018-05-23 NOTE — CONSULT NOTE ADULT - ASSESSMENT
48 yo with HTN, HLD , CAD (s/p MI 1998), SALVADOR (uses cpap), DM (type 2) obesity (s/p gastric bypass, 2004), OA s/p right TAMI.

## 2018-05-23 NOTE — BRIEF OPERATIVE NOTE - PROCEDURE
<<-----Click on this checkbox to enter Procedure Right total hip replacement  05/23/2018  posterior approach  Active  [id number]

## 2018-05-23 NOTE — DISCHARGE NOTE ADULT - NS AS ACTIVITY OBS
Do not drive or operate machinery/Walking-Indoors allowed/No Heavy lifting/straining/Walking-Outdoors allowed/Stairs allowed/Showering allowed

## 2018-05-23 NOTE — DISCHARGE NOTE ADULT - HOSPITAL COURSE
47yMale with history of Right Hip Pain presenting for Right TAMI by Dr. Lubin on 5/23/18. Risk and benefits of surgery were explained to the patient. The patient understood and agreed to proceed with surgery. Patient underwent the procedure with no intraoperative complications. Pt was brought in stable condition to the PACU. Once stable in PACU, pt was brought to the floor. During hospital stay pt was followed by Medicine, Pt had an uneventful hospital course. Pt is stable for discharge to Home with Home Care Services and PT

## 2018-05-23 NOTE — PHYSICAL THERAPY INITIAL EVALUATION ADULT - CRITERIA FOR SKILLED THERAPEUTIC INTERVENTIONS
therapy frequency/predicted duration of therapy intervention/anticipated discharge recommendation/impairments found/Home with home PT/functional limitations in following categories/risk reduction/prevention

## 2018-05-23 NOTE — DISCHARGE NOTE ADULT - CARE PLAN
Principal Discharge DX:	Primary osteoarthritis of right hip  Goal:	Improve Function, Decrease Pain  Assessment and plan of treatment:	Keep Prineo Dressing Clean, Dry and Intact. May shower with Prineo Dressing. Please do not scrub, soak, peel or pick at the prineo dressing. No creams, lotions, or oils over dressing. May shower and let water run over incision, no baths. Pat dry once out of shower. Dressing to be removed in office at follow up visit in 2 weeks.

## 2018-05-23 NOTE — PHYSICAL THERAPY INITIAL EVALUATION ADULT - ACTIVE RANGE OF MOTION EXAMINATION, REHAB EVAL
R hip AAROM flexion 85 degrees, R hip AAROM extension -10 degrees. R foot drop. All other extremities WFL/deficits as listed below

## 2018-05-23 NOTE — PHYSICAL THERAPY INITIAL EVALUATION ADULT - ADDITIONAL COMMENTS
Per patient, will be staying at girlfriend's home post-op. No stairs to enter, all amenities in one level. Girlfriend will be available for support post-op. Girlfriend's home has a shower/tub combo bathroom with regular height toilet seat. Denies use of mobility devices prior to admission, other than right foot AFO (not worn now as poorly fitting).

## 2018-05-23 NOTE — DISCHARGE NOTE ADULT - HOME CARE AGENCY
RN from [facility] [place], [phone], shall visit your home on 5/25/18 to initiate supportive home care services.  Physical and Occupational therapy evaluation shall be provided.

## 2018-05-23 NOTE — DISCHARGE NOTE ADULT - CARE PROVIDER_API CALL
Humberto Lubin), Orthopaedic Surgery  [address] [place] [address]  Phone: [phone]  Fax: [phone]

## 2018-05-23 NOTE — CONSULT NOTE ADULT - SUBJECTIVE AND OBJECTIVE BOX
HPI: 48 yo with HTN, HLD , CAD (s/p MI 1998), SALVADOR (uses cpap), DM (type 2) obesity (s/p gastric bypass, 2004), OA s/p right TAMI. Pt. has no complaints, pain in control, no n/v, no cp , no sob.      PAST MEDICAL & SURGICAL HISTORY:  Sleep apnea: USES CPAP  MI, old: 1998  Diabetes  Hyperlipidemia  HTN (hypertension)  S/P hip hemiarthroplasty: left hip pins then removal of pins  S/P lumbar laminectomy: 2000  S/P gastric bypass: 2004  S/P hip replacement, left: 2014      REVIEW OF SYSTEMS:    CONSTITUTIONAL: No fever, weight loss, + fatigue  EYES: No eye pain, visual disturbances, or discharge  ENMT:  No difficulty hearing, tinnitus, vertigo; No sinus or throat pain  RESPIRATORY: No cough, wheezing, chills or hemoptysis; No shortness of breath  CARDIOVASCULAR: No chest pain, palpitations, dizziness, or leg swelling  GASTROINTESTINAL: No abdominal or epigastric pain. No nausea, vomiting, or hematemesis; No diarrhea or constipation. No melena or hematochezia.  GENITOURINARY: No dysuria, frequency, hematuria, or incontinence  NEUROLOGICAL: No headaches, memory loss, loss of strength, numbness, or tremors  SKIN: No itching, burning, rashes, or lesions   MUSCULOSKELETAL: right hip pain in control      MEDICATIONS  (STANDING):  acetaminophen   Tablet 975 milliGRAM(s) Oral every 8 hours  ascorbic acid 500 milliGRAM(s) Oral two times a day  aspirin enteric coated 325 milliGRAM(s) Oral two times a day  ceFAZolin   IVPB 3000 milliGRAM(s) IV Intermittent every 8 hours  celecoxib 200 milliGRAM(s) Oral two times a day  docusate sodium 100 milliGRAM(s) Oral three times a day  ferrous    sulfate 325 milliGRAM(s) Oral three times a day with meals  folic acid 1 milliGRAM(s) Oral daily  lactated ringers. 1000 milliLiter(s) (120 mL/Hr) IV Continuous <Continuous>  multivitamin 1 Tablet(s) Oral daily  oxyCODONE    IR 5 milliGRAM(s) Oral every 4 hours  pantoprazole    Tablet 40 milliGRAM(s) Oral daily  polyethylene glycol 3350 17 Gram(s) Oral daily    MEDICATIONS  (PRN):  aluminum hydroxide/magnesium hydroxide/simethicone Suspension 30 milliLiter(s) Oral four times a day PRN Indigestion  diphenhydrAMINE   Capsule 25 milliGRAM(s) Oral at bedtime PRN Insomnia  HYDROmorphone  Injectable 1 milliGRAM(s) IV Push every 4 hours PRN breakthrough pain  ondansetron Injectable 4 milliGRAM(s) IV Push every 6 hours PRN Nausea and/or Vomiting  oxyCODONE    IR 5 milliGRAM(s) Oral every 4 hours PRN Mild Pain  oxyCODONE    IR 10 milliGRAM(s) Oral every 4 hours PRN Moderate Pain  senna 2 Tablet(s) Oral at bedtime PRN Constipation  zolpidem 5 milliGRAM(s) Oral at bedtime PRN Insomnia  zolpidem 5 milliGRAM(s) Oral at bedtime PRN Insomnia      Allergies    No Known Allergies    Intolerances        SOCIAL HISTORY:  single, lives alone, occasional EtOH, no smoking    FAMILY HISTORY:  non-contributory    Vital Signs Last 24 Hrs  T(C): 35.6 (23 May 2018 14:30), Max: 36.4 (23 May 2018 06:21)  T(F): 96 (23 May 2018 14:30), Max: 97.5 (23 May 2018 06:21)  HR: 84 (23 May 2018 15:45) (52 - 90)  BP: 131/78 (23 May 2018 15:45) (94/65 - 136/74)  BP(mean): --  RR: 16 (23 May 2018 14:30) (14 - 20)  SpO2: 96% (23 May 2018 15:45) (96% - 100%)    PHYSICAL EXAM:    GENERAL: NAD, well-groomed, well-developed  HEAD:  Atraumatic, Normocephalic  EYES: EOMI, PERRLA, conjunctiva and sclera clear  NERVOUS SYSTEM:  Alert & Oriented X3, Good concentration; Motor Strength 5/5 B/L upper and lower extremities  CHEST/LUNG: Clear to percussion bilaterally; No rales, rhonchi, wheezing, or rubs  HEART: Regular rate and rhythm; No murmurs, rubs, or gallops  ABDOMEN: Soft, Nontender, Nondistended; Bowel sounds present  EXTREMITIES:  2+ Peripheral Pulses, right hip c/d/i, mild edema and erythema      LABS:                        13.4   12.96 )-----------( 217      ( 23 May 2018 09:47 )             39.8     05-23    138  |  108  |  16  ----------------------------<  151<H>  5.2   |  27  |  0.85    Ca    8.2<L>      23 May 2018 09:47            RADIOLOGY & ADDITIONAL STUDIES:
consult dictated [id number]- obstructive sleep apnea no bipap needed presently

## 2018-05-23 NOTE — PATIENT PROFILE ADULT. - PSH
S/P gastric bypass  2004  S/P hip hemiarthroplasty  left hip pins then removal of pins  S/P hip replacement, left  2014  S/P lumbar laminectomy  2000

## 2018-05-23 NOTE — DISCHARGE NOTE ADULT - MEDICATION SUMMARY - MEDICATIONS TO STOP TAKING
I will STOP taking the medications listed below when I get home from the hospital:    aspirin 325 mg oral tablet  -- 1 tab(s) by mouth once a day    Aleve  -- 1 tab(s) by mouth 2 times a day

## 2018-05-23 NOTE — PHYSICAL THERAPY INITIAL EVALUATION ADULT - RANGE OF MOTION EXAMINATION, REHAB EVAL
deficits as listed below/R hip AAROM flexion 85 degrees, R hip AAROM extension -10 degrees. R foot drop. All other extremities WFL

## 2018-05-23 NOTE — PROGRESS NOTE ADULT - SUBJECTIVE AND OBJECTIVE BOX
Post-op Check   POD#0 s/p Right TAMI   47yMale Patient seen and examined, Pain controlled  Patient Denies SOB, CP, N/V/D       PE: Right Hip/LE: Dressing C/D/I, Sensation/motor intact, DP 2+, FROM ankle/toes    B/L LE: Skin intact. +ROM hip/knee/ankle/toes. Ankle Dorsi/plantarflexion: 5/5. Calf: soft, compressible and nontender. DP/PT 2+ NVI.                               13.4   12.96 )-----------( 217      ( 23 May 2018 09:47 )             39.8       05-23    138  |  108  |  16  ----------------------------<  151<H>  5.2   |  27  |  0.85    Ca    8.2<L>      23 May 2018 09:47          A: As above   P: Pain Control       DVT Prophylaxis      Incentive spirometry      Total hip precautions Reviewed       PT WBAT RLE      Isometric exercises      Discharge Planning      All the above discussed and understood by pt       Ortho to F/U

## 2018-05-23 NOTE — PHYSICAL THERAPY INITIAL EVALUATION ADULT - GAIT DEVIATIONS NOTED, PT EVAL
decreased elodia/decreased step length/increased time in double stance/decreased stride length/decreased velocity of limb motion/decreased weight-shifting ability

## 2018-05-24 VITALS
SYSTOLIC BLOOD PRESSURE: 126 MMHG | RESPIRATION RATE: 16 BRPM | DIASTOLIC BLOOD PRESSURE: 84 MMHG | HEART RATE: 75 BPM | TEMPERATURE: 98 F | OXYGEN SATURATION: 94 %

## 2018-05-24 DIAGNOSIS — D72.829 ELEVATED WHITE BLOOD CELL COUNT, UNSPECIFIED: ICD-10-CM

## 2018-05-24 LAB
ANION GAP SERPL CALC-SCNC: 11 MMOL/L — SIGNIFICANT CHANGE UP (ref 5–17)
BUN SERPL-MCNC: 11 MG/DL — SIGNIFICANT CHANGE UP (ref 7–23)
CALCIUM SERPL-MCNC: 9 MG/DL — SIGNIFICANT CHANGE UP (ref 8.5–10.1)
CHLORIDE SERPL-SCNC: 104 MMOL/L — SIGNIFICANT CHANGE UP (ref 96–108)
CO2 SERPL-SCNC: 24 MMOL/L — SIGNIFICANT CHANGE UP (ref 22–31)
CREAT SERPL-MCNC: 0.76 MG/DL — SIGNIFICANT CHANGE UP (ref 0.5–1.3)
GLUCOSE SERPL-MCNC: 166 MG/DL — HIGH (ref 70–99)
HCT VFR BLD CALC: 41.3 % — SIGNIFICANT CHANGE UP (ref 39–50)
HGB BLD-MCNC: 14.3 G/DL — SIGNIFICANT CHANGE UP (ref 13–17)
MCHC RBC-ENTMCNC: 29.3 PG — SIGNIFICANT CHANGE UP (ref 27–34)
MCHC RBC-ENTMCNC: 34.6 GM/DL — SIGNIFICANT CHANGE UP (ref 32–36)
MCV RBC AUTO: 84.6 FL — SIGNIFICANT CHANGE UP (ref 80–100)
NRBC # BLD: 0 /100 WBCS — SIGNIFICANT CHANGE UP (ref 0–0)
PLATELET # BLD AUTO: 272 K/UL — SIGNIFICANT CHANGE UP (ref 150–400)
POTASSIUM SERPL-MCNC: 3.8 MMOL/L — SIGNIFICANT CHANGE UP (ref 3.5–5.3)
POTASSIUM SERPL-SCNC: 3.8 MMOL/L — SIGNIFICANT CHANGE UP (ref 3.5–5.3)
RBC # BLD: 4.88 M/UL — SIGNIFICANT CHANGE UP (ref 4.2–5.8)
RBC # FLD: 12.9 % — SIGNIFICANT CHANGE UP (ref 10.3–14.5)
SODIUM SERPL-SCNC: 139 MMOL/L — SIGNIFICANT CHANGE UP (ref 135–145)
WBC # BLD: 12.03 K/UL — HIGH (ref 3.8–10.5)
WBC # FLD AUTO: 12.03 K/UL — HIGH (ref 3.8–10.5)

## 2018-05-24 RX ORDER — ASPIRIN/CALCIUM CARB/MAGNESIUM 324 MG
1 TABLET ORAL
Qty: 0 | Refills: 0 | COMMUNITY

## 2018-05-24 RX ORDER — CELECOXIB 200 MG/1
1 CAPSULE ORAL
Qty: 60 | Refills: 0 | OUTPATIENT
Start: 2018-05-24 | End: 2018-06-22

## 2018-05-24 RX ORDER — AMLODIPINE BESYLATE 2.5 MG/1
5 TABLET ORAL DAILY
Qty: 0 | Refills: 0 | Status: DISCONTINUED | OUTPATIENT
Start: 2018-05-24 | End: 2018-05-24

## 2018-05-24 RX ORDER — ASCORBIC ACID 60 MG
1 TABLET,CHEWABLE ORAL
Qty: 0 | Refills: 0 | COMMUNITY
Start: 2018-05-24

## 2018-05-24 RX ORDER — ASPIRIN/CALCIUM CARB/MAGNESIUM 324 MG
1 TABLET ORAL
Qty: 60 | Refills: 0 | OUTPATIENT
Start: 2018-05-24 | End: 2018-06-22

## 2018-05-24 RX ORDER — OXYCODONE HYDROCHLORIDE 5 MG/1
1 TABLET ORAL
Qty: 42 | Refills: 0 | OUTPATIENT
Start: 2018-05-24 | End: 2018-05-30

## 2018-05-24 RX ORDER — DOCUSATE SODIUM 100 MG
1 CAPSULE ORAL
Qty: 0 | Refills: 0 | COMMUNITY
Start: 2018-05-24

## 2018-05-24 RX ORDER — METFORMIN HYDROCHLORIDE 850 MG/1
500 TABLET ORAL DAILY
Qty: 0 | Refills: 0 | Status: DISCONTINUED | OUTPATIENT
Start: 2018-05-24 | End: 2018-05-24

## 2018-05-24 RX ORDER — LORATADINE 10 MG/1
10 TABLET ORAL DAILY
Qty: 0 | Refills: 0 | Status: DISCONTINUED | OUTPATIENT
Start: 2018-05-24 | End: 2018-05-24

## 2018-05-24 RX ORDER — PANTOPRAZOLE SODIUM 20 MG/1
1 TABLET, DELAYED RELEASE ORAL
Qty: 30 | Refills: 0 | OUTPATIENT
Start: 2018-05-24 | End: 2018-06-22

## 2018-05-24 RX ORDER — ATORVASTATIN CALCIUM 80 MG/1
20 TABLET, FILM COATED ORAL AT BEDTIME
Qty: 0 | Refills: 0 | Status: DISCONTINUED | OUTPATIENT
Start: 2018-05-24 | End: 2018-05-24

## 2018-05-24 RX ADMIN — OXYCODONE HYDROCHLORIDE 5 MILLIGRAM(S): 5 TABLET ORAL at 05:50

## 2018-05-24 RX ADMIN — OXYCODONE HYDROCHLORIDE 5 MILLIGRAM(S): 5 TABLET ORAL at 01:55

## 2018-05-24 RX ADMIN — Medication 1 TABLET(S): at 11:47

## 2018-05-24 RX ADMIN — PANTOPRAZOLE SODIUM 40 MILLIGRAM(S): 20 TABLET, DELAYED RELEASE ORAL at 11:47

## 2018-05-24 RX ADMIN — CELECOXIB 200 MILLIGRAM(S): 200 CAPSULE ORAL at 06:28

## 2018-05-24 RX ADMIN — CELECOXIB 200 MILLIGRAM(S): 200 CAPSULE ORAL at 05:50

## 2018-05-24 RX ADMIN — Medication 325 MILLIGRAM(S): at 05:50

## 2018-05-24 RX ADMIN — Medication 100 MILLIGRAM(S): at 05:50

## 2018-05-24 RX ADMIN — LORATADINE 10 MILLIGRAM(S): 10 TABLET ORAL at 11:47

## 2018-05-24 RX ADMIN — Medication 325 MILLIGRAM(S): at 07:43

## 2018-05-24 RX ADMIN — Medication 500 MILLIGRAM(S): at 05:50

## 2018-05-24 RX ADMIN — OXYCODONE HYDROCHLORIDE 5 MILLIGRAM(S): 5 TABLET ORAL at 10:17

## 2018-05-24 RX ADMIN — Medication 1 MILLIGRAM(S): at 11:47

## 2018-05-24 RX ADMIN — OXYCODONE HYDROCHLORIDE 5 MILLIGRAM(S): 5 TABLET ORAL at 06:28

## 2018-05-24 RX ADMIN — OXYCODONE HYDROCHLORIDE 5 MILLIGRAM(S): 5 TABLET ORAL at 02:55

## 2018-05-24 RX ADMIN — Medication 325 MILLIGRAM(S): at 11:48

## 2018-05-24 RX ADMIN — Medication 975 MILLIGRAM(S): at 05:50

## 2018-05-24 RX ADMIN — AMLODIPINE BESYLATE 5 MILLIGRAM(S): 2.5 TABLET ORAL at 09:37

## 2018-05-24 RX ADMIN — Medication 102 MILLIGRAM(S): at 05:50

## 2018-05-24 RX ADMIN — OXYCODONE HYDROCHLORIDE 5 MILLIGRAM(S): 5 TABLET ORAL at 11:17

## 2018-05-24 NOTE — PROGRESS NOTE ADULT - PROBLEM SELECTOR PLAN 1
cont with pain management and bowel regimen  h/h stable  dvt ppx as per ortho  OT/PT  plan for d/c home today

## 2018-05-24 NOTE — PROGRESS NOTE ADULT - SUBJECTIVE AND OBJECTIVE BOX
Patient is a 47y old  Male who presents with a chief complaint of Right Hip Pain (23 May 2018 14:18)      INTERVAL HPI/OVERNIGHT EVENTS:  no overnight events, pt. feels well, pain in control, no n/v, +urination, +flatus    MEDICATIONS  (STANDING):  amLODIPine   Tablet 5 milliGRAM(s) Oral daily  ascorbic acid 500 milliGRAM(s) Oral two times a day  aspirin enteric coated 325 milliGRAM(s) Oral two times a day  atorvastatin 20 milliGRAM(s) Oral at bedtime  celecoxib 200 milliGRAM(s) Oral two times a day  docusate sodium 100 milliGRAM(s) Oral three times a day  ferrous    sulfate 325 milliGRAM(s) Oral three times a day with meals  folic acid 1 milliGRAM(s) Oral daily  lactated ringers. 1000 milliLiter(s) (120 mL/Hr) IV Continuous <Continuous>  loratadine 10 milliGRAM(s) Oral daily  multivitamin 1 Tablet(s) Oral daily  pantoprazole    Tablet 40 milliGRAM(s) Oral daily  polyethylene glycol 3350 17 Gram(s) Oral daily    MEDICATIONS  (PRN):  aluminum hydroxide/magnesium hydroxide/simethicone Suspension 30 milliLiter(s) Oral four times a day PRN Indigestion  diphenhydrAMINE   Capsule 25 milliGRAM(s) Oral at bedtime PRN Insomnia  HYDROmorphone  Injectable 1 milliGRAM(s) IV Push every 4 hours PRN breakthrough pain  ondansetron Injectable 4 milliGRAM(s) IV Push every 6 hours PRN Nausea and/or Vomiting  oxyCODONE    IR 5 milliGRAM(s) Oral every 4 hours PRN Mild Pain  oxyCODONE    IR 10 milliGRAM(s) Oral every 4 hours PRN Moderate Pain  senna 2 Tablet(s) Oral at bedtime PRN Constipation  zolpidem 5 milliGRAM(s) Oral at bedtime PRN Insomnia  zolpidem 5 milliGRAM(s) Oral at bedtime PRN Insomnia      Allergies    No Known Allergies    Intolerances        REVIEW OF SYSTEMS:  CONSTITUTIONAL: No fever, weight loss, or fatigue  EYES: No eye pain, visual disturbances, or discharge  ENMT:  No difficulty hearing, tinnitus, vertigo; No sinus or throat pain  RESPIRATORY: No cough, wheezing, chills or hemoptysis; No shortness of breath  CARDIOVASCULAR: No chest pain, palpitations, dizziness, or leg swelling  GASTROINTESTINAL: No abdominal or epigastric pain. No nausea, vomiting, or hematemesis; No diarrhea or constipation. No melena or hematochezia.  GENITOURINARY: No dysuria, frequency, hematuria, or incontinence  NEUROLOGICAL: No headaches, memory loss, loss of strength, numbness, or tremors  MUSCULOSKELETAL: No joint pain or swelling; No muscle, back, or extremity pain      Vital Signs Last 24 Hrs  T(C): 36.9 (24 May 2018 11:31), Max: 36.9 (24 May 2018 11:31)  T(F): 98.4 (24 May 2018 11:31), Max: 98.4 (24 May 2018 11:31)  HR: 75 (24 May 2018 11:31) (69 - 82)  BP: 126/84 (24 May 2018 11:31) (126/84 - 138/88)  BP(mean): --  RR: 16 (24 May 2018 11:31) (16 - 16)  SpO2: 94% (24 May 2018 11:31) (94% - 99%)    PHYSICAL EXAM:  GENERAL: NAD, well-groomed, well-developed  HEAD:  Atraumatic, Normocephalic  EYES: EOMI, PERRLA, conjunctiva and sclera clear  ENMT: No tonsillar erythema, exudates, or enlargement; Moist mucous membranes,   NERVOUS SYSTEM:  Alert & Oriented X3, Good concentration; Motor Strength 5/5 B/L upper and lower extremities;  CHEST/LUNG: Clear to percussion bilaterally; No rales, rhonchi, wheezing, or rubs  HEART: Regular rate and rhythm; No murmurs, rubs, or gallops  ABDOMEN: Soft, Nontender, Nondistended; Bowel sounds present  EXTREMITIES:  2+ Peripheral Pulsesright hip healing well, mild edema and erythema      LABS:                        14.3   12.03 )-----------( 272      ( 24 May 2018 06:37 )             41.3     05-24    139  |  104  |  11  ----------------------------<  166<H>  3.8   |  24  |  0.76    Ca    9.0      24 May 2018 06:37          CAPILLARY BLOOD GLUCOSE          RADIOLOGY & ADDITIONAL TESTS:    Imaging Personally Reviewed:  [ ] YES  [ ] NO    Consultant(s) Notes Reviewed:  [ ] YES  [ ] NO    Care Discussed with Consultants/Other Providers [x ] YES  [ ] NO

## 2018-05-24 NOTE — PROGRESS NOTE ADULT - SUBJECTIVE AND OBJECTIVE BOX
POD#1 s/p Right TAMI   47yMale Patient seen and examined, Pain controlled  Patient Denies SOB, CP, N/V/D       PE: Right Hip/LE: Dressing C/D/I, Sensation/motor intact, DP 2+, FROM ankle/toes   B/L LE: Skin intact. +ROM hip/knee/ankle/toes. Ankle Dorsi/plantarflexion: 5/5. Calf: soft, compressible and nontender. DP/PT 2+ NVI.                             14.3   12.03 )-----------( 272      ( 24 May 2018 06:37 )             41.3       05-24    139  |  104  |  11  ----------------------------<  166<H>  3.8   |  24  |  0.76    Ca    9.0      24 May 2018 06:37          A: As above   P: Pain Control       DVT Prophylaxis      Incentive spirometry      Total hip precautions Reviewed       PT WBAT RLE       Isometric exercises      Discharge Planning      All the above discussed and understood by pt       Ortho to F/U

## 2018-05-24 NOTE — PROGRESS NOTE ADULT - ASSESSMENT
46 yo with HTN, HLD , CAD (s/p MI 1998), SALVADOR (uses cpap), DM (type 2) obesity (s/p gastric bypass, 2004), OA s/p right TAMI.

## 2018-05-25 LAB — SURGICAL PATHOLOGY FINAL REPORT - CH: SIGNIFICANT CHANGE UP

## 2018-05-26 DIAGNOSIS — M16.11 UNILATERAL PRIMARY OSTEOARTHRITIS, RIGHT HIP: ICD-10-CM

## 2018-05-26 DIAGNOSIS — Z98.84 BARIATRIC SURGERY STATUS: ICD-10-CM

## 2018-05-26 DIAGNOSIS — Z96.642 PRESENCE OF LEFT ARTIFICIAL HIP JOINT: ICD-10-CM

## 2018-05-26 DIAGNOSIS — I10 ESSENTIAL (PRIMARY) HYPERTENSION: ICD-10-CM

## 2018-05-26 DIAGNOSIS — Z99.89 DEPENDENCE ON OTHER ENABLING MACHINES AND DEVICES: ICD-10-CM

## 2018-05-26 DIAGNOSIS — E11.65 TYPE 2 DIABETES MELLITUS WITH HYPERGLYCEMIA: ICD-10-CM

## 2018-05-26 DIAGNOSIS — D72.829 ELEVATED WHITE BLOOD CELL COUNT, UNSPECIFIED: ICD-10-CM

## 2018-05-26 DIAGNOSIS — E78.5 HYPERLIPIDEMIA, UNSPECIFIED: ICD-10-CM

## 2018-05-26 DIAGNOSIS — E66.9 OBESITY, UNSPECIFIED: ICD-10-CM

## 2018-05-26 DIAGNOSIS — I25.83 CORONARY ATHEROSCLEROSIS DUE TO LIPID RICH PLAQUE: ICD-10-CM

## 2018-05-26 DIAGNOSIS — G47.33 OBSTRUCTIVE SLEEP APNEA (ADULT) (PEDIATRIC): ICD-10-CM

## 2018-05-26 DIAGNOSIS — I25.2 OLD MYOCARDIAL INFARCTION: ICD-10-CM

## 2018-06-21 RX ORDER — PSYLLIUM HUSK 0.4 G
CAPSULE ORAL
Refills: 0 | Status: ACTIVE | COMMUNITY

## 2018-06-21 RX ORDER — ASPIRIN 325 MG/1
325 TABLET, FILM COATED ORAL DAILY
Qty: 90 | Refills: 0 | Status: ACTIVE | COMMUNITY

## 2018-06-29 ENCOUNTER — TRANSCRIPTION ENCOUNTER (OUTPATIENT)
Age: 48
End: 2018-06-29

## 2018-07-02 ENCOUNTER — APPOINTMENT (OUTPATIENT)
Dept: CARDIOLOGY | Facility: CLINIC | Age: 48
End: 2018-07-02

## 2018-08-27 ENCOUNTER — APPOINTMENT (OUTPATIENT)
Dept: CARDIOLOGY | Facility: CLINIC | Age: 48
End: 2018-08-27

## 2018-11-06 ENCOUNTER — TRANSCRIPTION ENCOUNTER (OUTPATIENT)
Age: 48
End: 2018-11-06

## 2019-02-01 ENCOUNTER — TRANSCRIPTION ENCOUNTER (OUTPATIENT)
Age: 49
End: 2019-02-01

## 2019-03-18 ENCOUNTER — CLINICAL ADVICE (OUTPATIENT)
Age: 49
End: 2019-03-18

## 2019-03-19 PROBLEM — E11.9 TYPE 2 DIABETES MELLITUS WITHOUT COMPLICATIONS: Chronic | Status: ACTIVE | Noted: 2018-05-08

## 2019-03-19 PROBLEM — E78.5 HYPERLIPIDEMIA, UNSPECIFIED: Chronic | Status: ACTIVE | Noted: 2018-05-08

## 2019-03-19 PROBLEM — I10 ESSENTIAL (PRIMARY) HYPERTENSION: Chronic | Status: ACTIVE | Noted: 2018-05-08

## 2019-03-19 PROBLEM — G47.30 SLEEP APNEA, UNSPECIFIED: Chronic | Status: ACTIVE | Noted: 2018-05-08

## 2019-03-19 PROBLEM — I25.2 OLD MYOCARDIAL INFARCTION: Chronic | Status: ACTIVE | Noted: 2018-05-08

## 2019-04-03 ENCOUNTER — APPOINTMENT (OUTPATIENT)
Dept: CARDIOLOGY | Facility: CLINIC | Age: 49
End: 2019-04-03
Payer: COMMERCIAL

## 2019-04-03 ENCOUNTER — NON-APPOINTMENT (OUTPATIENT)
Age: 49
End: 2019-04-03

## 2019-04-03 VITALS
HEIGHT: 75 IN | HEART RATE: 77 BPM | DIASTOLIC BLOOD PRESSURE: 70 MMHG | BODY MASS INDEX: 34.44 KG/M2 | WEIGHT: 277 LBS | RESPIRATION RATE: 16 BRPM | SYSTOLIC BLOOD PRESSURE: 120 MMHG

## 2019-04-03 PROCEDURE — 93000 ELECTROCARDIOGRAM COMPLETE: CPT

## 2019-04-03 PROCEDURE — 99214 OFFICE O/P EST MOD 30 MIN: CPT

## 2019-04-03 RX ORDER — PNV NO.95/FERROUS FUM/FOLIC AC 28MG-0.8MG
TABLET ORAL DAILY
Refills: 0 | Status: DISCONTINUED | COMMUNITY
End: 2019-04-03

## 2019-04-03 NOTE — ADDENDUM
[FreeTextEntry1] : Echocardiogram: 5/14/18\par \par Increased LV size with overall normal left ventricular systolic function. LVEF 60%.\par No significant valvular disease\par no evidence of pulmonary protection.\par \par Exercise stress test 5/1/80.\par  Pharmacologic stress test. Imaging performed shows no evidence of inducible ischemia. Artifact did limit the sensitivity and specificity.\par \par Impression:\par 1. Noncompliance with diet and exercise.\par 2. Noncompliance with CPAP mask.\par 3. Short lived acceleration of hypertension which has now come back to baseline.\par 4. Patient has overall preservation of cardiac chamber sizes and function.\par 5. No evidence of ischemic heart disease.\par 6. No anginal symptoms.\par 7. Pharmacologic stress test reveals no evidence of ischemia. \par \par Plan:\par 1. Instructed the patient to resume CPAP therapy.\par 2. Patient will monitor blood pressure carefully and if diastolics remain elevated in the mid-80s he'll contact me.\par 3. Instructed the patient about the benefits of a diet that restricts portion sizes, increases frequency of meals and consists of  vegetables, (more green and leafy),fruit and nuts, whole grains, lean proteins and limits carbohydrates and meat and dairy fats\par 4. The patient was instructed to follow a symptom limited regimen of moderate aerobic exercise for 30 minutes 3 to 4 days a week. A warm up and cool down period are to be added to the regimen and small incremental changes can be made every few weeks. Any new symptoms of exercise related chest pain or dyspnea should be reported.\par 5. Six-month followup\par Feel free to contact me with regard to any questions.

## 2019-04-03 NOTE — REASON FOR VISIT
[FreeTextEntry1] : Patient is a 48-year-old male presenting for cardiac re-evaluation\par \par His history includes that of:\par 1. Cardiomegaly\par 2. Hypertension\par 3. Diabetes\par 4. Hyperlipidemia\par 5. A small non-Q-wave myocardial infarction many years ago.

## 2019-04-03 NOTE — PHYSICAL EXAM
[General Appearance - Well Developed] : well developed [General Appearance - Well Nourished] : well nourished [Normal Conjunctiva] : the conjunctiva exhibited no abnormalities [Eyelids - No Xanthelasma] : the eyelids demonstrated no xanthelasmas [Normal Oral Mucosa] : normal oral mucosa [No Oral Pallor] : no oral pallor [No Oral Cyanosis] : no oral cyanosis [Normal Jugular Venous A Waves Present] : normal jugular venous A waves present [Normal Jugular Venous V Waves Present] : normal jugular venous V waves present [No Jugular Venous Bryan A Waves] : no jugular venous bryan A waves [Respiration, Rhythm And Depth] : normal respiratory rhythm and effort [Exaggerated Use Of Accessory Muscles For Inspiration] : no accessory muscle use [Auscultation Breath Sounds / Voice Sounds] : lungs were clear to auscultation bilaterally [Heart Rate And Rhythm] : heart rate and rhythm were normal [Heart Sounds] : normal S1 and S2 [Murmurs] : no murmurs present [Abdomen Soft] : soft [Abdomen Tenderness] : non-tender [Abdomen Mass (___ Cm)] : no abdominal mass palpated [Abnormal Walk] : normal gait [Gait - Sufficient For Exercise Testing] : the gait was sufficient for exercise testing [Nail Clubbing] : no clubbing of the fingernails [Cyanosis, Localized] : no localized cyanosis [Petechial Hemorrhages (___cm)] : no petechial hemorrhages [Skin Color & Pigmentation] : normal skin color and pigmentation [] : no rash [No Venous Stasis] : no venous stasis [Skin Lesions] : no skin lesions [No Skin Ulcers] : no skin ulcer [No Xanthoma] : no  xanthoma was observed [Oriented To Time, Place, And Person] : oriented to person, place, and time [Affect] : the affect was normal [Mood] : the mood was normal [No Anxiety] : not feeling anxious

## 2019-04-03 NOTE — ASSESSMENT
[FreeTextEntry1] : ECG: Normal sinus rhythm at 81 beats per minute. Left anterior fascicular block. Left atrial enlargement. The lid or progression. No acute ST or T wave changes.\par \par Impression:\par Patient has multiple cardiac risk factors including that of hypertension, diabetes and hyperlipidemia.\par He has a history of cardiomegaly with last echocardiogram one year ago showing left ventricular enlargement hypertrophy with preserved function.\par He has a history of a remote non-Q wave MI.\par \par In view of the fact that he has been relatively sedentary due to the bad hip, and the aforementioned history, my recommendation is that he undergo noninvasive testing to include a repeat echocardiogram and a pharmacologic today nuclear stress test.\par \par I would like to also see his CPAP efficacy reassessed by pulmonary.\par \par Final clearance pending the aforementioned cardiac testing.

## 2019-04-03 NOTE — HISTORY OF PRESENT ILLNESS
[FreeTextEntry1] : Underwent a previous left and right total hip replacement in 2014 which was well tolerated.\par \par He has not had any significant cardiac symptoms. Insert cardiac symptoms. No other new intercurrent medical problems.\par \par Problems first CPAP is not working effectively at this time for his obstructive sleep apnea which is quite severe.\par Concerned about a weekend where his blood pressure suddenly accelerated without any obvious reason. It was associated with headache but no other symptoms. Seems to spontaneously resolved over the last few days.\par

## 2019-05-02 ENCOUNTER — RECORD ABSTRACTING (OUTPATIENT)
Age: 49
End: 2019-05-02

## 2019-05-22 ENCOUNTER — TRANSCRIPTION ENCOUNTER (OUTPATIENT)
Age: 49
End: 2019-05-22

## 2019-05-29 ENCOUNTER — TRANSCRIPTION ENCOUNTER (OUTPATIENT)
Age: 49
End: 2019-05-29

## 2019-06-06 ENCOUNTER — TRANSCRIPTION ENCOUNTER (OUTPATIENT)
Age: 49
End: 2019-06-06

## 2019-08-11 ENCOUNTER — TRANSCRIPTION ENCOUNTER (OUTPATIENT)
Age: 49
End: 2019-08-11

## 2019-09-01 ENCOUNTER — TRANSCRIPTION ENCOUNTER (OUTPATIENT)
Age: 49
End: 2019-09-01

## 2019-09-16 ENCOUNTER — RECORD ABSTRACTING (OUTPATIENT)
Age: 49
End: 2019-09-16

## 2019-09-26 ENCOUNTER — APPOINTMENT (OUTPATIENT)
Dept: CARDIOLOGY | Facility: CLINIC | Age: 49
End: 2019-09-26
Payer: COMMERCIAL

## 2019-09-26 ENCOUNTER — NON-APPOINTMENT (OUTPATIENT)
Age: 49
End: 2019-09-26

## 2019-09-26 VITALS
HEIGHT: 75 IN | BODY MASS INDEX: 34.57 KG/M2 | DIASTOLIC BLOOD PRESSURE: 80 MMHG | HEART RATE: 76 BPM | OXYGEN SATURATION: 98 % | SYSTOLIC BLOOD PRESSURE: 120 MMHG | WEIGHT: 278 LBS | RESPIRATION RATE: 16 BRPM

## 2019-09-26 PROCEDURE — 99214 OFFICE O/P EST MOD 30 MIN: CPT

## 2019-09-26 PROCEDURE — 93000 ELECTROCARDIOGRAM COMPLETE: CPT

## 2019-09-26 NOTE — HISTORY OF PRESENT ILLNESS
[FreeTextEntry1] : Underwent a previous left and right total hip replacement in 2014 which were well tolerated.\par \par He has not had any significant cardiac symptoms Patient denies any chest pain, shortness of breath, palpitations, PND, orthopnea or edema\par . No other new intercurrent medical problems.\par \par Compliant with mediations, CPAP used nightly\par \par No exertional discomfort with physical activity

## 2019-09-26 NOTE — ASSESSMENT
[FreeTextEntry1] : ECG: Normal sinus rhythm at 76 beats per minute. Left anterior fascicular block., nonspecific QRS widening\par \par Lab data 7/9/19\par Chol 129\par LDL   74\par HDL  38\par Creat. 0.66\par HGB 13.9\par \par Impression:\par -Patient has multiple cardiac risk factors including that of hypertension, diabetes and hyperlipidemia.\par \par - He has a history of cardiomegaly with last echocardiogram one year ago showing left ventricular enlargement     hypertrophy with preserved function.\par \par -He has a history of a remote non-Q wave MI\par \par -Blood pressure today 120/80 and controlled. \par \par - Moderately obese and weight has been unchanged\par \par - Lipids controlled and tolerating Atorvastatin\par \par - CPAP used nightly, recently received a new machine, managed through [facility] in [place]\par \par - No anginal symptoms. Physically active with out any exertional discomfort.\par \par \par Plan\par 1. Repeat echo to assess cardiomegaly and  and EST  to assess CAD and mult CRF's prior to follow up\par 2. Continue to F/U with PCP as scheduled and have all blood work faxed to our office.\par 3. Exercise.\par 4, Instructed the patient about the benefits of a diet that restricts portion sizes, increases frequency of meals and consists of  vegetables, (more green and leafy),fruit and nuts, whole grains, lean proteins and limits carbohydrates and meat and dairy fats\par 5. Continued use of CPAP\par \par Instructed to call with any cardiac associated symptoms.\par \par Clinical follow up in 6 months \par \par

## 2019-09-26 NOTE — PHYSICAL EXAM
[General Appearance - Well Developed] : well developed [Normal Conjunctiva] : the conjunctiva exhibited no abnormalities [Eyelids - No Xanthelasma] : the eyelids demonstrated no xanthelasmas [Normal Oral Mucosa] : normal oral mucosa [No Oral Pallor] : no oral pallor [No Oral Cyanosis] : no oral cyanosis [Normal Jugular Venous A Waves Present] : normal jugular venous A waves present [Normal Jugular Venous V Waves Present] : normal jugular venous V waves present [No Jugular Venous Bryan A Waves] : no jugular venous bryan A waves [Exaggerated Use Of Accessory Muscles For Inspiration] : no accessory muscle use [Respiration, Rhythm And Depth] : normal respiratory rhythm and effort [Auscultation Breath Sounds / Voice Sounds] : lungs were clear to auscultation bilaterally [Heart Rate And Rhythm] : heart rate and rhythm were normal [Heart Sounds] : normal S1 and S2 [Murmurs] : no murmurs present [Abdomen Tenderness] : non-tender [Abdomen Soft] : soft [Abdomen Mass (___ Cm)] : no abdominal mass palpated [Abnormal Walk] : normal gait [Gait - Sufficient For Exercise Testing] : the gait was sufficient for exercise testing [Nail Clubbing] : no clubbing of the fingernails [Petechial Hemorrhages (___cm)] : no petechial hemorrhages [Cyanosis, Localized] : no localized cyanosis [Skin Color & Pigmentation] : normal skin color and pigmentation [No Venous Stasis] : no venous stasis [] : no rash [No Skin Ulcers] : no skin ulcer [Skin Lesions] : no skin lesions [No Xanthoma] : no  xanthoma was observed [Affect] : the affect was normal [Oriented To Time, Place, And Person] : oriented to person, place, and time [Mood] : the mood was normal [No Anxiety] : not feeling anxious [FreeTextEntry1] : obese in no apparent distress

## 2020-01-04 ENCOUNTER — TRANSCRIPTION ENCOUNTER (OUTPATIENT)
Age: 50
End: 2020-01-04

## 2020-02-07 NOTE — H&P PST ADULT - NS SC CAGE ALCOHOL ANNOYED YOU
Adam Barba is a 7 m.o. male with:   1. Trisomy 21  2. Atrial septal defect, ventricular septal defect and patent ductus arteriosus  - s/p patch closure of atrial septal defect and ventricular septal defect, ligation of patent ductus arteriosus (1/16/2020)  - small residual shunt vs LV to RA shunt   3. Postoperative left ventricular dysfunction, pulmonary hemorrhage and inability to come off cardiopulmonary bypass. Presumed pulmonary hemorrhage secondary to left atrial hypertension secondary to left ventricular dysfunction (systolic, diastolic or both).   - S/p ECMO x 8 days (deccanulated 1/24/20).   - S/p delayed sternal closure (1/27/20)  - Post-op/post ECMO decannulation junctional rhythm that has not recurred.  4. Moderate branch pulmonary artery stenosis  5. Congenital hydronephrosis, improving  6. Tethered cord  7. Scalp pressure ulcer  8. Concern for milky chest tube output, low volume.       Plan:  Neuro:   - Oral sedation transition/wean: D/c methadone   Resp:   - Ventilation plan: RA  - Goal sat normal, may have oxygen as tolerated    - Pulmonary toilet BID  CVS:   - Inotropic support: Off Milrinone  - Rhythm: Sinus.   - Lasix PO Q12    FEN/GI:   - Tolerating feeds.  24 kcal/oz, goal minimum of 20 ounces/day, PO ad nnamdi.   - Monitor electrolytes and replace as needed  Heme/ID:  - s/p vanc/cefepime   - Goal Hct >30  - Wound care following scalp ulcer  - Ancef for wounds, started 2/2/20, plan for 7 days.   Plastics:  - PIV     no

## 2020-03-20 ENCOUNTER — APPOINTMENT (OUTPATIENT)
Dept: CARDIOLOGY | Facility: CLINIC | Age: 50
End: 2020-03-20

## 2020-05-26 ENCOUNTER — APPOINTMENT (OUTPATIENT)
Dept: CARDIOLOGY | Facility: CLINIC | Age: 50
End: 2020-05-26

## 2020-06-05 NOTE — H&P PST ADULT - VENOUS THROMBOEMBOLISM FOR WOMEN ONLY
Problem: Alteration in Thoughts and Perception  Goal: Treatment Goal: Gain control of psychotic behaviors/thinking, reduce/eliminate presenting symptoms and demonstrate improved reality functioning upon discharge  Outcome: Progressing  Goal: Verbalize thoughts and feelings  Description  Interventions:  - Promote a nonjudgmental and trusting relationship with the patient through active listening and therapeutic communication  - Assess patient's level of functioning, behavior and potential for risk  - Engage patient in 1 on 1 interactions  - Encourage patient to express fears, feelings, frustrations, and discuss symptoms    - Peck patient to reality, help patient recognize reality-based thinking   - Administer medications as ordered and assess for potential side effects  - Provide the patient education related to the signs and symptoms of the illness and desired effects of prescribed medications  Outcome: Progressing  Goal: Refrain from acting on delusional thinking/internal stimuli  Description  Interventions:  - Monitor patient closely, per order   - Utilize least restrictive measures   - Set reasonable limits, give positive feedback for acceptable   - Administer medications as ordered and monitor of potential side effects  Outcome: Progressing  Goal: Agree to be compliant with medication regime, as prescribed and report medication side effects  Description  Interventions:  - Offer appropriate PRN medication and supervise ingestion; conduct AIMS, as needed   Outcome: Progressing  Goal: Attend and participate in unit activities, including therapeutic, recreational, and educational groups  Description  Interventions:  -Encourage Visitation and family involvement in care  Outcome: Progressing  Goal: Recognize dysfunctional thoughts, communicate reality-based thoughts at the time of discharge  Description  Interventions:  - Provide medication and psycho-education to assist patient in compliance and developing insight into his/her illness   Outcome: Progressing  Goal: Complete daily ADLs, including personal hygiene independently, as able  Description  Interventions:  - Observe, teach, and assist patient with ADLS  - Monitor and promote a balance of rest/activity, with adequate nutrition and elimination   Outcome: Progressing     Problem: Ineffective Coping  Goal: Identifies ineffective coping skills  Outcome: Progressing  Goal: Identifies healthy coping skills  Outcome: Progressing  Goal: Demonstrates healthy coping skills  Outcome: Progressing  Goal: Patient/Family participate in treatment and DC plans  Description  Interventions:  - Provide therapeutic environment  Outcome: Progressing  Goal: Patient/Family verbalizes awareness of resources  Outcome: Progressing  Goal: Understands least restrictive measures  Description  Interventions:  - Utilize least restrictive behavior  Outcome: Progressing     Problem: RESPIRATORY - ADULT  Goal: Achieves optimal ventilation and oxygenation  Description  INTERVENTIONS:  - Assess for changes in respiratory status  - Assess for changes in mentation and behavior  - Position to facilitate oxygenation and minimize respiratory effort  - Oxygen administered by appropriate delivery if ordered  - Initiate smoking cessation education as indicated  - Encourage broncho-pulmonary hygiene including cough, deep breathe, Incentive Spirometry  - Assess the need for suctioning and aspirate as needed  - Assess and instruct to report SOB or any respiratory difficulty  - Respiratory Therapy support as indicated  Outcome: Progressing     Problem: GASTROINTESTINAL - ADULT  Goal: Minimal or absence of nausea and/or vomiting  Description  INTERVENTIONS:  - Administer IV fluids if ordered to ensure adequate hydration  - Maintain NPO status until nausea and vomiting are resolved  - Nasogastric tube if ordered  - Administer ordered antiemetic medications as needed  - Provide nonpharmacologic comfort measures as appropriate  - Advance diet as tolerated, if ordered  - Consider nutrition services referral to assist patient with adequate nutrition and appropriate food choices  Outcome: Progressing  Goal: Maintains or returns to baseline bowel function  Description  INTERVENTIONS:  - Assess bowel function  - Encourage oral fluids to ensure adequate hydration  - Administer IV fluids if ordered to ensure adequate hydration  - Administer ordered medications as needed  - Encourage mobilization and activity  - Consider nutritional services referral to assist patient with adequate nutrition and appropriate food choices  Outcome: Progressing  Goal: Maintains adequate nutritional intake  Description  INTERVENTIONS:  - Monitor percentage of each meal consumed  - Identify factors contributing to decreased intake, treat as appropriate  - Assist with meals as needed  - Monitor I&O, weight, and lab values if indicated  - Obtain nutrition services referral as needed  Outcome: Progressing     Problem: METABOLIC, FLUID AND ELECTROLYTES - ADULT  Goal: Glucose maintained within target range  Description  INTERVENTIONS:  - Monitor Blood Glucose as ordered  - Assess for signs and symptoms of hyperglycemia and hypoglycemia  - Administer ordered medications to maintain glucose within target range  - Assess nutritional intake and initiate nutrition service referral as needed  Outcome: Progressing     Problem: DISCHARGE PLANNING  Goal: Discharge to home or other facility with appropriate resources  Description    CASE MANAGEMENT INTERVENTIONS:  - Conduct assessment to determine patient/family and health care team treatment goals, and need for post-acute services based on payer coverage, community resources, patient preferences and barriers to discharge    - Address psychosocial, clinical, and financial barriers to discharge as identified in assessment in conjunction with the patient/family and health care team   - Assist the patient in reintegration back into the community by removing barriers which may hinder a successful discharge once deemed stable  - Arrange appropriate level of post-acute services according to patient's needs and preference and payer coverage in collaboration with the physician and health care team   - Communicate with and update the patient/family, physician, and health care team regarding progress on the discharge plan  - Arrange appropriate transportation to post-acute venues  Outcome: Progressing     Quiet, cooperative and visible on unit  Med and meal compliant  Ate 100% of both meals  Denies depression, anxiety, SI and HI  C/o of 3:10 right foot pain, given 325mg PRN APAP, effective for relief  Behaviors controlled, no outbursts  Disheveled and layered clothing  Patient refusing to remove and wash layers of clothing  Patient attended morning walk  Maintained on patient safety precautions and mouth checks w/o incident  Due for labs 6/13/20    Will continue to monitor progress in recovery program not applicable

## 2020-09-10 ENCOUNTER — APPOINTMENT (OUTPATIENT)
Dept: CARDIOLOGY | Facility: CLINIC | Age: 50
End: 2020-09-10
Payer: COMMERCIAL

## 2020-09-10 PROCEDURE — 93306 TTE W/DOPPLER COMPLETE: CPT

## 2020-09-11 RX ORDER — PERFLUTREN 6.52 MG/ML
6.52 INJECTION, SUSPENSION INTRAVENOUS
Qty: 2 | Refills: 0 | Status: COMPLETED | OUTPATIENT
Start: 2020-09-11

## 2020-09-11 RX ADMIN — PERFLUTREN MG/ML: 6.52 INJECTION, SUSPENSION INTRAVENOUS at 00:00

## 2020-09-14 ENCOUNTER — APPOINTMENT (OUTPATIENT)
Dept: CARDIOLOGY | Facility: CLINIC | Age: 50
End: 2020-09-14
Payer: COMMERCIAL

## 2020-09-14 ENCOUNTER — NON-APPOINTMENT (OUTPATIENT)
Age: 50
End: 2020-09-14

## 2020-09-14 VITALS
OXYGEN SATURATION: 98 % | WEIGHT: 278 LBS | RESPIRATION RATE: 16 BRPM | SYSTOLIC BLOOD PRESSURE: 110 MMHG | TEMPERATURE: 97.2 F | DIASTOLIC BLOOD PRESSURE: 75 MMHG | BODY MASS INDEX: 34.57 KG/M2 | HEIGHT: 75 IN | HEART RATE: 63 BPM

## 2020-09-14 DIAGNOSIS — Z00.00 ENCOUNTER FOR GENERAL ADULT MEDICAL EXAMINATION W/OUT ABNORMAL FINDINGS: ICD-10-CM

## 2020-09-14 PROCEDURE — 93000 ELECTROCARDIOGRAM COMPLETE: CPT

## 2020-09-14 PROCEDURE — 99214 OFFICE O/P EST MOD 30 MIN: CPT

## 2020-09-14 NOTE — REASON FOR VISIT
[FreeTextEntry1] : Patient is a 49 -year-old male presenting for cardiac re-evaluation\par \par His history includes that of:\par 1. Cardiomegaly\par 2. Hypertension\par 3. Diabetes\par 4. Hyperlipidemia\par 5. A small non-Q-wave myocardial infarction many years ago.\par 6. SALVADOR

## 2020-09-14 NOTE — PHYSICAL EXAM
[General Appearance - Well Developed] : well developed [Normal Conjunctiva] : the conjunctiva exhibited no abnormalities [Normal Oral Mucosa] : normal oral mucosa [Eyelids - No Xanthelasma] : the eyelids demonstrated no xanthelasmas [No Oral Pallor] : no oral pallor [Normal Jugular Venous V Waves Present] : normal jugular venous V waves present [No Oral Cyanosis] : no oral cyanosis [Normal Jugular Venous A Waves Present] : normal jugular venous A waves present [No Jugular Venous Bryan A Waves] : no jugular venous bryan A waves [Respiration, Rhythm And Depth] : normal respiratory rhythm and effort [Heart Rate And Rhythm] : heart rate and rhythm were normal [Exaggerated Use Of Accessory Muscles For Inspiration] : no accessory muscle use [Auscultation Breath Sounds / Voice Sounds] : lungs were clear to auscultation bilaterally [Heart Sounds] : normal S1 and S2 [Abdomen Soft] : soft [Murmurs] : no murmurs present [Abdomen Tenderness] : non-tender [Abdomen Mass (___ Cm)] : no abdominal mass palpated [Abnormal Walk] : normal gait [Gait - Sufficient For Exercise Testing] : the gait was sufficient for exercise testing [Cyanosis, Localized] : no localized cyanosis [Nail Clubbing] : no clubbing of the fingernails [] : no ischemic changes [Skin Color & Pigmentation] : normal skin color and pigmentation [Petechial Hemorrhages (___cm)] : no petechial hemorrhages [No Venous Stasis] : no venous stasis [Skin Lesions] : no skin lesions [No Xanthoma] : no  xanthoma was observed [No Skin Ulcers] : no skin ulcer [Affect] : the affect was normal [Oriented To Time, Place, And Person] : oriented to person, place, and time [Mood] : the mood was normal [No Anxiety] : not feeling anxious [FreeTextEntry1] : obese in no apparent distress

## 2020-09-14 NOTE — ASSESSMENT
[FreeTextEntry1] : ECG: Normal sinus rhythm at 63  beats per minute. Left anterior fascicular block., PRWP. possible old ASMI nonspecific QRS widening unchanged from the prior\par \par Lab data \par        7/9/19 1/9/20\par Chol  129        95\par LDL    74         49\par HDL    38        30\par Creat. 0.66\par HGB 13.9\par a1c                7.5\par \par \par Echocardiogram 9/10/20:\par Technically difficult study\par Area of apical and apical posterior wall hypokinesis with an ejection fraction that is low normal to mildly reduced 50%. Comparison to prior study suggest that the ejection fraction is lower.\par \par Impression:\par -Patient has multiple cardiac risk factors including that of hypertension, diabetes and hyperlipidemia.\par \par - He has a history of cardiomegaly with the most recent echocardiogram showing a reduction in the left ventricular ejection fraction. Possibly, because of the use of Definity on this study, we are just seeing better endocardial definition and this is revealing an ejection fraction reduction that had previously been present but undetected.\par \par -He has a history of a remote non-Q wave MI\par \par -Blood pressure has been controlled. \par \par - Moderately obese and weight has been unchanged\par \par - Lipids well controlled and tolerating Atorvastatin\par \par - CPAP used nightly, recently received a new machine, managed through [facility] in [place]\par \par - No anginal symptoms. Physically active with out any exertional discomfort.\par \par - DM not as well controlled \par \par Plan\par 1.Increase metformin to 500 mg b.i.d. and repeat A1c\par 2. Continue to F/U with PCP as scheduled and have all blood work faxed to our office.\par 3. Exercise.\par 4, Instructed the patient about the benefits of a diet that restricts portion sizes, increases frequency of meals and consists of  vegetables, (more green and leafy),fruit and nuts, whole grains, lean proteins and limits carbohydrates and meat and dairy fats\par 5. Continued use of CPAP\par \par Instructed to call with any cardiac associated symptoms.\par \par Clinical follow up in 6 months \par \par

## 2020-10-26 ENCOUNTER — TRANSCRIPTION ENCOUNTER (OUTPATIENT)
Age: 50
End: 2020-10-26

## 2020-11-02 ENCOUNTER — TRANSCRIPTION ENCOUNTER (OUTPATIENT)
Age: 50
End: 2020-11-02

## 2021-02-24 ENCOUNTER — RESULT REVIEW (OUTPATIENT)
Age: 51
End: 2021-02-24

## 2021-02-24 ENCOUNTER — OUTPATIENT (OUTPATIENT)
Dept: OUTPATIENT SERVICES | Facility: HOSPITAL | Age: 51
LOS: 1 days | End: 2021-02-24
Payer: COMMERCIAL

## 2021-02-24 ENCOUNTER — APPOINTMENT (OUTPATIENT)
Dept: ULTRASOUND IMAGING | Facility: CLINIC | Age: 51
End: 2021-02-24
Payer: COMMERCIAL

## 2021-02-24 DIAGNOSIS — Z96.649 PRESENCE OF UNSPECIFIED ARTIFICIAL HIP JOINT: Chronic | ICD-10-CM

## 2021-02-24 DIAGNOSIS — Z96.642 PRESENCE OF LEFT ARTIFICIAL HIP JOINT: Chronic | ICD-10-CM

## 2021-02-24 DIAGNOSIS — Z00.8 ENCOUNTER FOR OTHER GENERAL EXAMINATION: ICD-10-CM

## 2021-02-24 DIAGNOSIS — Z98.84 BARIATRIC SURGERY STATUS: Chronic | ICD-10-CM

## 2021-02-24 DIAGNOSIS — Z98.890 OTHER SPECIFIED POSTPROCEDURAL STATES: Chronic | ICD-10-CM

## 2021-02-24 PROCEDURE — 20611 DRAIN/INJ JOINT/BURSA W/US: CPT | Mod: LT

## 2021-02-24 PROCEDURE — 20611 DRAIN/INJ JOINT/BURSA W/US: CPT

## 2021-05-06 ENCOUNTER — APPOINTMENT (OUTPATIENT)
Dept: CARDIOLOGY | Facility: CLINIC | Age: 51
End: 2021-05-06
Payer: COMMERCIAL

## 2021-05-06 VITALS
RESPIRATION RATE: 16 BRPM | WEIGHT: 248 LBS | BODY MASS INDEX: 30.84 KG/M2 | TEMPERATURE: 97.6 F | SYSTOLIC BLOOD PRESSURE: 110 MMHG | DIASTOLIC BLOOD PRESSURE: 80 MMHG | HEART RATE: 65 BPM | OXYGEN SATURATION: 98 % | HEIGHT: 75 IN

## 2021-05-06 PROCEDURE — 93000 ELECTROCARDIOGRAM COMPLETE: CPT

## 2021-05-06 PROCEDURE — 99072 ADDL SUPL MATRL&STAF TM PHE: CPT

## 2021-05-06 PROCEDURE — 99214 OFFICE O/P EST MOD 30 MIN: CPT

## 2021-05-06 RX ORDER — METFORMIN HYDROCHLORIDE 500 MG/1
500 TABLET, COATED ORAL DAILY
Qty: 90 | Refills: 3 | Status: DISCONTINUED | COMMUNITY
End: 2021-05-06

## 2021-05-06 NOTE — REVIEW OF SYSTEMS
[Weight Loss (___ Lbs)] : [unfilled] ~Ulb weight loss [FreeTextEntry2] : Other than as documented here and in the HPI, the thirteen point ROS is negative

## 2021-05-06 NOTE — ASSESSMENT
[FreeTextEntry1] : ECG: Normal sinus rhythm at 65  beats per minute. Left anterior fascicular block., PRWP. possible old ASMI nonspecific QRS widening unchanged from the prior\par \par Lab data \par        7/9/19   1/9/20  3/9/21    5/3/21\par Chol  129        95       82          94\par LDL    74         49      39          50\par HDL    38        30       30          36\par Creat. 0.66\par HGB 13.9\par a1c                7.5      6.1       5.2\par \par \par Echocardiogram 9/10/20:\par Technically difficult study\par Area of apical and apical posterior wall hypokinesis with an ejection fraction that is low normal to mildly reduced 50%. Comparison to prior study suggest that the ejection fraction is lower.\par \par Impression:\par -Patient with a hx of multiple cardiac risk factors including that of hypertension, diabetes and hyperlipidemia.\par With a very aggressive weight loss regimen, he has dropped 30 pounds on our scale and 40 on his scale in a period of about 4 months.\par \par Subsequently developed an episode of orthostatic dizziness.\par His A1c is come down into normal range.\par He feels substantially improved.\par \par - He has a history of cardiomegaly with the most recent echocardiogram showing a reduction in the left ventricular ejection fraction. Possibly, because of the use of Definity on this study, we are just seeing better endocardial definition and this is revealing an ejection fraction reduction that had previously been present but undetected.\par \par -He has a history of a remote non-Q wave MI\par \par - Lipids well controlled and tolerating Atorvastatin\par \par - CPAP used nightly, recently received a new machine, managed through [facility] in [place]\par \par - No anginal symptoms. Physically active with out any exertional discomfort.\par \par - DM seem to have resolved.\par \par Plan\par 1 we will stay on lowered dose of lisinopril 5 mg and amlodipine 2.5 mg staggering them morning and evening.\par 2.  He will remain off of Metformin for now.\par 3.  Continue his aggressive exercise regimen exercise.\par 4, Instructed the patient about the benefits of a diet that restricts portion sizes, increases frequency of meals and consists of  vegetables, (more green and leafy),fruit and nuts, whole grains, lean proteins and limits carbohydrates and meat and dairy fats\par 5. Continued use of CPAP until he gets this reassessed.  Potentially his sleep apnea is much improved.  A repeat sleep study should be considered.\par \par Instructed to call with any cardiac associated symptoms.\par \par Clinical follow up in 6 months \par \par

## 2021-05-06 NOTE — REASON FOR VISIT
[FreeTextEntry1] : Patient is a 50  -year-old male presenting for cardiac re-evaluation\par \par His history includes that of:\par 1. Cardiomegaly\par 2. Hypertension\par 3. Diabetes\par 4. Hyperlipidemia\par 5. A small non-Q-wave myocardial infarction many years ago.\par 6. [place]

## 2021-05-06 NOTE — HISTORY OF PRESENT ILLNESS
[FreeTextEntry1] : In January, patient began weight watchers with a weight of 286.  He has been exercising regularly both with walking and using the [employer] bike with great success.  In the process he has lost over 40 pounds.\par This week he had episodes of orthostatic dizziness.  Saw the PMD who cut his lisinopril and amlodipine in half.  No recurrent dizziness since then but does feel a bit out of sorts.\par \par Underwent a previous left and right total hip replacement in 2014 which were well tolerated.\par \par He has not had any significant cardiac symptoms Patient denies any chest pain, shortness of breath, palpitations, PND, orthopnea or edema\par . No other new intercurrent medical problems.\par \par Compliant with mediations, CPAP used nightly\par \par No exertional discomfort with physical activity

## 2021-05-06 NOTE — PHYSICAL EXAM
[General Appearance - Well Developed] : well developed [Normal Conjunctiva] : the conjunctiva exhibited no abnormalities [Eyelids - No Xanthelasma] : the eyelids demonstrated no xanthelasmas [Normal Oral Mucosa] : normal oral mucosa [No Oral Pallor] : no oral pallor [No Oral Cyanosis] : no oral cyanosis [Normal Jugular Venous A Waves Present] : normal jugular venous A waves present [Normal Jugular Venous V Waves Present] : normal jugular venous V waves present [No Jugular Venous Bryan A Waves] : no jugular venous bryan A waves [Respiration, Rhythm And Depth] : normal respiratory rhythm and effort [Exaggerated Use Of Accessory Muscles For Inspiration] : no accessory muscle use [Auscultation Breath Sounds / Voice Sounds] : lungs were clear to auscultation bilaterally [Heart Rate And Rhythm] : heart rate and rhythm were normal [Heart Sounds] : normal S1 and S2 [Murmurs] : no murmurs present [Abdomen Soft] : soft [Abdomen Tenderness] : non-tender [Abdomen Mass (___ Cm)] : no abdominal mass palpated [Abnormal Walk] : normal gait [Gait - Sufficient For Exercise Testing] : the gait was sufficient for exercise testing [Nail Clubbing] : no clubbing of the fingernails [Cyanosis, Localized] : no localized cyanosis [Petechial Hemorrhages (___cm)] : no petechial hemorrhages [Skin Color & Pigmentation] : normal skin color and pigmentation [] : no rash [No Venous Stasis] : no venous stasis [Skin Lesions] : no skin lesions [No Skin Ulcers] : no skin ulcer [No Xanthoma] : no  xanthoma was observed [Oriented To Time, Place, And Person] : oriented to person, place, and time [Affect] : the affect was normal [Mood] : the mood was normal [No Anxiety] : not feeling anxious [FreeTextEntry1] : obese in no apparent distress

## 2021-08-15 ENCOUNTER — EMERGENCY (EMERGENCY)
Facility: HOSPITAL | Age: 51
LOS: 1 days | Discharge: HOME | End: 2021-08-15
Attending: STUDENT IN AN ORGANIZED HEALTH CARE EDUCATION/TRAINING PROGRAM | Admitting: STUDENT IN AN ORGANIZED HEALTH CARE EDUCATION/TRAINING PROGRAM
Payer: COMMERCIAL

## 2021-08-15 VITALS
HEIGHT: 75 IN | SYSTOLIC BLOOD PRESSURE: 130 MMHG | HEART RATE: 73 BPM | WEIGHT: 235.01 LBS | TEMPERATURE: 97 F | OXYGEN SATURATION: 96 % | DIASTOLIC BLOOD PRESSURE: 84 MMHG | RESPIRATION RATE: 18 BRPM

## 2021-08-15 VITALS
OXYGEN SATURATION: 97 % | HEART RATE: 72 BPM | SYSTOLIC BLOOD PRESSURE: 122 MMHG | TEMPERATURE: 98 F | RESPIRATION RATE: 18 BRPM | DIASTOLIC BLOOD PRESSURE: 78 MMHG

## 2021-08-15 DIAGNOSIS — Z96.642 PRESENCE OF LEFT ARTIFICIAL HIP JOINT: Chronic | ICD-10-CM

## 2021-08-15 DIAGNOSIS — Z98.890 OTHER SPECIFIED POSTPROCEDURAL STATES: Chronic | ICD-10-CM

## 2021-08-15 DIAGNOSIS — Z98.84 BARIATRIC SURGERY STATUS: Chronic | ICD-10-CM

## 2021-08-15 DIAGNOSIS — Z96.649 PRESENCE OF UNSPECIFIED ARTIFICIAL HIP JOINT: Chronic | ICD-10-CM

## 2021-08-15 PROCEDURE — G1004: CPT

## 2021-08-15 PROCEDURE — 72125 CT NECK SPINE W/O DYE: CPT

## 2021-08-15 PROCEDURE — 73130 X-RAY EXAM OF HAND: CPT | Mod: 26,LT

## 2021-08-15 PROCEDURE — 72125 CT NECK SPINE W/O DYE: CPT | Mod: 26

## 2021-08-15 PROCEDURE — 99284 EMERGENCY DEPT VISIT MOD MDM: CPT | Mod: 25

## 2021-08-15 PROCEDURE — 73080 X-RAY EXAM OF ELBOW: CPT

## 2021-08-15 PROCEDURE — 73080 X-RAY EXAM OF ELBOW: CPT | Mod: 26,LT

## 2021-08-15 PROCEDURE — 73610 X-RAY EXAM OF ANKLE: CPT | Mod: 26,RT

## 2021-08-15 PROCEDURE — 96374 THER/PROPH/DIAG INJ IV PUSH: CPT

## 2021-08-15 PROCEDURE — 72128 CT CHEST SPINE W/O DYE: CPT | Mod: 26,MG

## 2021-08-15 PROCEDURE — 72128 CT CHEST SPINE W/O DYE: CPT | Mod: MG

## 2021-08-15 PROCEDURE — 71046 X-RAY EXAM CHEST 2 VIEWS: CPT

## 2021-08-15 PROCEDURE — 72192 CT PELVIS W/O DYE: CPT | Mod: 26,MG

## 2021-08-15 PROCEDURE — 99285 EMERGENCY DEPT VISIT HI MDM: CPT

## 2021-08-15 PROCEDURE — 73610 X-RAY EXAM OF ANKLE: CPT

## 2021-08-15 PROCEDURE — 71046 X-RAY EXAM CHEST 2 VIEWS: CPT | Mod: 26

## 2021-08-15 PROCEDURE — 70450 CT HEAD/BRAIN W/O DYE: CPT

## 2021-08-15 PROCEDURE — 73130 X-RAY EXAM OF HAND: CPT

## 2021-08-15 PROCEDURE — 70450 CT HEAD/BRAIN W/O DYE: CPT | Mod: 26

## 2021-08-15 PROCEDURE — 72192 CT PELVIS W/O DYE: CPT | Mod: MG

## 2021-08-15 RX ORDER — SODIUM CHLORIDE 9 MG/ML
1000 INJECTION INTRAMUSCULAR; INTRAVENOUS; SUBCUTANEOUS ONCE
Refills: 0 | Status: COMPLETED | OUTPATIENT
Start: 2021-08-15 | End: 2021-08-15

## 2021-08-15 RX ORDER — IBUPROFEN 200 MG
1 TABLET ORAL
Qty: 21 | Refills: 0
Start: 2021-08-15 | End: 2021-08-21

## 2021-08-15 RX ORDER — METHOCARBAMOL 500 MG/1
2 TABLET, FILM COATED ORAL
Qty: 30 | Refills: 0
Start: 2021-08-15 | End: 2021-08-19

## 2021-08-15 RX ORDER — KETOROLAC TROMETHAMINE 30 MG/ML
15 SYRINGE (ML) INJECTION ONCE
Refills: 0 | Status: DISCONTINUED | OUTPATIENT
Start: 2021-08-15 | End: 2021-08-15

## 2021-08-15 RX ORDER — OXYCODONE AND ACETAMINOPHEN 5; 325 MG/1; MG/1
1 TABLET ORAL ONCE
Refills: 0 | Status: DISCONTINUED | OUTPATIENT
Start: 2021-08-15 | End: 2021-08-15

## 2021-08-15 RX ADMIN — Medication 15 MILLIGRAM(S): at 12:58

## 2021-08-15 RX ADMIN — SODIUM CHLORIDE 1000 MILLILITER(S): 9 INJECTION INTRAMUSCULAR; INTRAVENOUS; SUBCUTANEOUS at 10:51

## 2021-08-15 RX ADMIN — OXYCODONE AND ACETAMINOPHEN 1 TABLET(S): 5; 325 TABLET ORAL at 10:51

## 2021-08-15 NOTE — ED PROVIDER NOTE - NSICDXPASTSURGICALHX_GEN_ALL_CORE_FT
PAST SURGICAL HISTORY:  S/P gastric bypass 2004    S/P hip hemiarthroplasty left hip pins then removal of pins    S/P hip replacement, left 2014    S/P lumbar laminectomy 2000
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
## 2021-08-15 NOTE — ED PROVIDER NOTE - PROGRESS NOTE DETAILS
Reevaluated patient at bedside.  Patient feeling much improved.  Discussed the results of all diagnostic testing in ED and copies of all available reports given. patient aware of lytic lesion left ischium (recommend ortho follow up)  An opportunity to ask questions was provided.  Discussed the importance of prompt, close medical follow-up.  Patient will return with any changes, concerns or persistent/worsening symptoms.  Understanding of all instructions verbalized. recommend ortho and concussion clinic follow up.
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
## 2021-08-15 NOTE — ED ADULT NURSE NOTE - NSIMPLEMENTINTERV_GEN_ALL_ED
Implemented All Universal Safety Interventions:  [place] to call system. Call bell, personal items and telephone within reach. Instruct patient to call for assistance. Room bathroom lighting operational. Non-slip footwear when patient is off stretcher. Physically safe environment: no spills, clutter or unnecessary equipment. Stretcher in lowest position, wheels locked, appropriate side rails in place.

## 2021-08-15 NOTE — ED PROVIDER NOTE - CARE PROVIDER_API CALL
Jim Redman  ORTHOPAEDIC SURGERY  [address] [facility], [address] [place] [address]  Phone: [phone]  Fax: [phone]  Follow Up Time: 1-3 Days
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
## 2021-08-15 NOTE — ED ADULT NURSE NOTE - COLLISION WITH
CHIEF COMPLAINT: Pre-op History and Physical with Intraocular Lens measurements for planned cataract surgery.    HISTORY OF PRESENT ILLNESS: Mr. Cervantes presents for axial length measurements for planned upcoming cataract surgery on his LEFT eye.  A clinically significant cataract was identified on the last exam. Patient complains of decreased vision. Pt wants to know if we can do avastin at the time of surgery.    PAST MEDICAL HISTORY:   Past Medical History:   Diagnosis Date   • Abscess of abdominal cavity (CMS/[facility]) 7/21/2015   • Attention deficit disorder    • Bronchitis    • Calcaneal fracture 11/2014    right   • Carpal tunnel syndrome    • Cataract    • Chronic pain    • CKD (chronic kidney disease), stage V (CMS/[facility]) 5/25/2016    Dialysis; [facility] [phone] MWF    • Collar bone fracture 7/2015    left   • Conductive hearing loss of right ear with unrestricted hearing of left ear 10/09/2017   • Decorative tattoo    • Diabetes mellitus type 1 (CMS/[facility]) AGE 7    type 1 child   • Diabetic retinopathy (CMS/[facility])    • Fracture of right humerus 7/2015    nondisplaced   • GERD (gastroesophageal reflux disease)    • HTN (hypertension)    • Pneumonia     annual pneumonia and bronchitis   • PONV (postoperative nausea and vomiting)    • Right chronic serous otitis media 10/09/2017   • Sepsis (CMS/[facility])     hospitalized 3/10/-3/16/2018 [facility]; MSSA infected permcath; removed 3/12/2018       reviewed     MEDICATIONS:   Current Outpatient Medications   Medication Sig Dispense Refill   • ofloxacin (OCUFLOX) 0.3 % ophthalmic solution 1 drop 4 times a day in the operated eye, starting day before surgery 5 mL 1   • difluprednate (DUREZOL) 0.05 % ophthalmic emulsion 1 drop 2 times a day in the operated eye, starting day of surgery 5 mL 1   • nepafenac (ILEVRO) 0.3 % ophthalmic suspension 1 drop daily in operated eye, start day before surgery 3 mL 1   • B complex-vitamin C-folic acid (NEPHRO-DANII) 0.8 MG Tab  Take 1 tablet by mouth nightly.  4   • aspirin 81 MG chewable tablet Chew 1 tablet by mouth daily. Do not start before Fang 15, 2019. 90 tablet 3   • insulin lispro (HUMALOG KWIKPEN) 100 UNIT/ML pen-injector Inject 6-8 Units into the skin 3 times daily (before meals). Per sliding scale.  Over 150 add 1 units every 50     • insulin glargine (LANTUS SOLOSTAR) 100 UNIT/ML pen-injector Inject 5 Units into the skin 2 times daily. (Patient taking differently: Inject 5 Units into the skin 2 times daily. Pt is taking 15 units BID) 15 mL 1   • cloNIDine (CATAPRES) 0.3 MG tablet Take 1 tablet by mouth 3 times daily. 90 tablet 1   • ticagrelor (BRILINTA) 90 MG Tab Take 1 tablet by mouth 2 times daily. 60 tablet 6   • minoxidil (LONITEN) 2.5 MG tablet Take 5 mg by mouth 2 times daily. Dose2 tablets(=5 MG).     • HYDROcodone-acetaminophen (NORCO) 5-325 MG per tablet Take 1 tablet by mouth every 6 hours as needed for Pain. 20 tablet 0   • albuterol 108 (90 Base) MCG/ACT inhaler INHALE 2 PUFFS INTO THE LUNGS EVERY 4 HOURS AS NEEDED FOR SHORTNESS OF BREATH OR WHEEZING 8.5 g 0   • sevelamer carbonate (RENVELA) 800 MG tablet Take 2,400 mg by mouth 3 times daily (with meals). Dose:3 tablets (2400 MG).     • TEMazepam (RESTORIL) 15 MG capsule TAKE 1 CAPSULE BY MOUTH AT BEDTIME AS NEEDED FOR SLEEP 30 capsule 0   • ONETOUCH VERIO test strip TEST FOUR TIMES DAILY AS DIRECTED 100 strip 1   • cycloSPORINE (RESTASIS) 0.05 % ophthalmic emulsion Place 1 drop into both eyes 2 times daily. 60 vial 6   • rOPINIRole (REQUIP) 0.5 MG tablet Take 1 tablet by mouth daily. (Patient taking differently: Take 0.5 mg by mouth daily as needed. Indications: Restless Leg Syndrome ) 30 tablet 0   • enalapril (VASOTEC) 2.5 MG tablet Take 2.5 mg by mouth 2 times daily.      • ONETOUCH VERIO test strip USE TO TEST BLOOD SUGAR FOUR TIMES DAILY AS DIRECTED 100 strip 0   • Insulin Pen Needle 32G X 4 MM Misc Use to inject insulin 7 times daily. Remove needle cover(s)  to expose needle before injecting. 210 each 11   • esomeprazole (NEXIUM) 20 MG capsule TAKE 1 CAPSULE BY MOUTH DAILY BEFORE BREAKFAST 90 capsule 3   • metoPROLOL succinate (TOPROL-XL) 50 MG 24 hr tablet TAKE 1 TABLET BY MOUTH DAILY 30 tablet 5   • amLODIPine (NORVASC) 5 MG tablet TAKE 2 TABLETS BY MOUTH DAILY 120 tablet 2   • Blood Glucose Monitoring Suppl (ONETOUCH VERIO) w/Device Kit TEST AS DIRECTED 1 kit 0   • atorvastatin (LIPITOR) 80 MG tablet Take 1 tablet by mouth daily. 90 tablet 3   • calcium carbonate (TUMS) 500 MG chewable tablet Chew 1 tablet by mouth 3 times daily. 90 tablet 3   • Insulin Syringe-Needle U-100 29G X 5/16\" 0.5 ML Misc Use with insulin injections QiD 360 each 3   • blood glucose lancets Three times a day 300 each 5   • DISPENSE Please dispense insulin syringes and needles.  Test blood sugars three times a day. 1 Container 3   • sharps container For lancets and insulin syringes 1 each 5     No current facility-administered medications for this visit.        reviewed     EXAM:  Blood Pressure: 130/62  Cardiovascular: regular rate and rhythm  Lung exam: normal    Best corrected vision was CF    INTRAOCULAR LENS CALCULATION/SELECTION DISCUSSION:  The IOL master was performed on LEFT eye and the Intraocular lens calculations were reviewed in detail.  I had a thorough discussion with Mr. Cervantes on the postoperative refraction options (distance, monovision), goals, and expectations.  I also had a detailed discussion with him on all the available premium Intraocular lenses (multifocal, toric) including a thorough discussion on the pros/cons, risks, benefits and cost.      All his questions were answered.  Patient understands his refraction issues/options well.  Patient elected to proceed with monofocal lens, understanding the need for glasses after surgery.     Patient understands vision will be limited after cataract surgery due to pdr / erm /cme.  Pt understands the possible need for  injections, lasers, surgeries after cataract surgery.     The lens selection are as follows:    Posterior chamber intraocular lens: [id number] +19.5 diopters.    Sulcus lens: [id number] +19.0 diopters    PLANNED REFRACTIVE OUTCOME:  plano    INFORMED CONSENT DISCUSSION:  CATARACT  consistent with his visual acuity and multiple visual/lifestyle complaints. Mr. Cervantes is very symptomatic and wants/needs to see better.  I had a detailed discussion with him on the natural history of cataracts, treatment options, and surgical risks including:  death, blindness, loss of eye, endophthalmitis, hemorrhage, retinal detachment, cystic macular edema, elevated eye pressure, need for more surgery, anisometropia, diplopia, pupil problems, Intraocular Lens related problems as discussed, dislocation/retention of lens material; multiple other potential complications were discussed. All his questions were answered, and the consent forms were reviewed, signed, and witnessed. I will proceed with phacoemulsification with Intraocular lens as scheduled on the LEFT eye.    PLAN: Remain NPO after midnight the night before surgery.   Use antibiotic and non steroidal drops the day before surgery.   Take BP pill in the morning with a sip of water.       car
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
## 2021-08-15 NOTE — ED PROVIDER NOTE - MUSCULOSKELETAL MINIMAL EXAM
+ TTP midline cervical and upper thoracic spine. + TTP right ankle with FROM, + TTP left elbow with FROM, + TTP left hand/thumb, + TTP left lateral hip with decreased ROM. no chest wall tenderness, no rib tenderness. radial and dp pulses equal and intact bilaterally.
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
## 2021-08-15 NOTE — ED PROVIDER NOTE - NSICDXPASTMEDICALHX_GEN_ALL_CORE_FT
PAST MEDICAL HISTORY:  Diabetes     HTN (hypertension)     Hyperlipidemia     MI, old 1998    Sleep apnea USES CPAP

## 2021-08-15 NOTE — ED PROVIDER NOTE - PATIENT PORTAL LINK FT
You can access the FollowMyHealth Patient Portal offered by [facility] by registering at the following website: [email or web address]. By joining [employer]’s FollowMyHealth portal, you will also be able to view your health information using other applications (apps) compatible with our system.

## 2021-08-31 NOTE — PHYSICAL THERAPY INITIAL EVALUATION ADULT - LIVES WITH, PROFILE
Health Maintenance Due   Topic Date Due   • Hepatitis B Vaccine (1 of 3 - Risk Recombivax 3-dose series) [birth year]/27/1982   • Shingles Vaccine (1 of 2) Never done   • Colorectal Cancer Screen-  Never done   • Hepatitis C Screening  Never done   • Diabetes Foot Exam  08/25/2017   • Pneumococcal Vaccine 0-64 (2 of 4 - PCV13) 03/13/2018   • Diabetes Eye Exam  08/20/2021       Patient is due for topics as listed above but is not proceeding with Immunization(s) Hep B, Pneumococcal and Shingles, Colorectal Cancer Screening: Colonoscopy and Diabetes Foot Exam at this time.          significant other

## 2021-11-16 ENCOUNTER — APPOINTMENT (OUTPATIENT)
Dept: CARDIOLOGY | Facility: CLINIC | Age: 51
End: 2021-11-16

## 2022-01-20 ENCOUNTER — RX RENEWAL (OUTPATIENT)
Age: 52
End: 2022-01-20

## 2022-02-08 ENCOUNTER — APPOINTMENT (OUTPATIENT)
Dept: CARDIOLOGY | Facility: CLINIC | Age: 52
End: 2022-02-08

## 2022-04-12 ENCOUNTER — RX RENEWAL (OUTPATIENT)
Age: 52
End: 2022-04-12

## 2022-04-24 ENCOUNTER — TRANSCRIPTION ENCOUNTER (OUTPATIENT)
Age: 52
End: 2022-04-24

## 2022-05-07 ENCOUNTER — NON-APPOINTMENT (OUTPATIENT)
Age: 52
End: 2022-05-07

## 2022-05-19 NOTE — DISCHARGE NOTE ADULT - PATIENT PORTAL LINK FT
Sinusitis: Care Instructions  Your Care Instructions     Sinusitis is an infection of the lining of the sinus cavities in your head. Sinusitis often follows a cold. It causes pain and pressure in your head and face. In most cases, sinusitis gets better on its own in 1 to 2 weeks. But some mild symptoms may last for several weeks. Sometimes antibiotics are needed. Follow-up care is a key part of your treatment and safety. Be sure to make and go to all appointments, and call your doctor if you are having problems. It's also a good idea to know your test results and keep a list of the medicines you take. How can you care for yourself at home? · Take an over-the-counter pain medicine, such as acetaminophen (Tylenol), ibuprofen (Advil, Motrin), or naproxen (Aleve). Read and follow all instructions on the label. · If the doctor prescribed antibiotics, take them as directed. Do not stop taking them just because you feel better. You need to take the full course of antibiotics. · Be careful when taking over-the-counter cold or flu medicines and Tylenol at the same time. Many of these medicines have acetaminophen, which is Tylenol. Read the labels to make sure that you are not taking more than the recommended dose. Too much acetaminophen (Tylenol) can be harmful. · Breathe warm, moist air from a steamy shower, a hot bath, or a sink filled with hot water. Avoid cold, dry air. Using a humidifier in your home may help. Follow the directions for cleaning the machine. · Use saline (saltwater) nasal washes. This can help keep your nasal passages open and wash out mucus and bacteria. You can buy saline nose drops at a grocery store or drugstore. Or you can make your own at home by adding 1 teaspoon of salt and 1 teaspoon of baking soda to 2 cups of distilled water. If you make your own, fill a bulb syringe with the solution, insert the tip into your nostril, and squeeze gently. Shan Like your nose.   · Put a hot, wet towel or a warm gel pack on your face 3 or 4 times a day for 5 to 10 minutes each time. · Try a decongestant nasal spray like oxymetazoline (Afrin). Do not use it for more than 3 days in a row. Using it for more than 3 days can make your congestion worse. When should you call for help? Call your doctor now or seek immediate medical care if:    · You have new or worse swelling or redness in your face or around your eyes.     · You have a new or higher fever. Watch closely for changes in your health, and be sure to contact your doctor if:    · You have new or worse facial pain.     · The mucus from your nose becomes thicker (like pus) or has new blood in it.     · You are not getting better as expected. Where can you learn more? Go to http://www.gray.com/  Enter X211 in the search box to learn more about \"Sinusitis: Care Instructions. \"  Current as of: September 8, 2021               Content Version: 13.2  © 2006-2022 [employer]. Care instructions adapted under license by [employer] (which disclaims liability or warranty for this information). If you have questions about a medical condition or this instruction, always ask your healthcare professional. [place] [address] any warranty or liability for your use of this information. You can access the [facility] Patient Portal, offered by [facility], by registering with the following website: [email or web address]

## 2022-07-05 ENCOUNTER — RX RENEWAL (OUTPATIENT)
Age: 52
End: 2022-07-05

## 2022-08-03 ENCOUNTER — RX RENEWAL (OUTPATIENT)
Age: 52
End: 2022-08-03

## 2023-09-11 ENCOUNTER — NON-APPOINTMENT (OUTPATIENT)
Age: 53
End: 2023-09-11

## 2023-10-18 ENCOUNTER — NON-APPOINTMENT (OUTPATIENT)
Age: 53
End: 2023-10-18

## 2023-11-01 ENCOUNTER — APPOINTMENT (OUTPATIENT)
Dept: CARDIOLOGY | Facility: CLINIC | Age: 53
End: 2023-11-01
Payer: COMMERCIAL

## 2023-11-01 VITALS
DIASTOLIC BLOOD PRESSURE: 80 MMHG | OXYGEN SATURATION: 98 % | WEIGHT: 285 LBS | HEART RATE: 75 BPM | BODY MASS INDEX: 35.43 KG/M2 | RESPIRATION RATE: 16 BRPM | HEIGHT: 75 IN | SYSTOLIC BLOOD PRESSURE: 132 MMHG

## 2023-11-01 DIAGNOSIS — R94.31 ABNORMAL ELECTROCARDIOGRAM [ECG] [EKG]: ICD-10-CM

## 2023-11-01 DIAGNOSIS — I51.7 CARDIOMEGALY: ICD-10-CM

## 2023-11-01 PROCEDURE — 93000 ELECTROCARDIOGRAM COMPLETE: CPT

## 2023-11-01 PROCEDURE — 99215 OFFICE O/P EST HI 40 MIN: CPT | Mod: 25

## 2023-11-01 RX ORDER — AMLODIPINE BESYLATE 2.5 MG/1
2.5 TABLET ORAL DAILY
Qty: 90 | Refills: 0 | Status: DISCONTINUED | COMMUNITY
Start: 2022-01-20 | End: 2023-11-01

## 2023-11-01 RX ORDER — AMLODIPINE BESYLATE 10 MG/1
10 TABLET ORAL DAILY
Qty: 90 | Refills: 1 | Status: ACTIVE | COMMUNITY

## 2023-11-01 RX ORDER — DULAGLUTIDE 0.75 MG/.5ML
0.75 INJECTION, SOLUTION SUBCUTANEOUS
Refills: 0 | Status: ACTIVE | COMMUNITY

## 2023-11-01 RX ORDER — LISINOPRIL 10 MG/1
10 TABLET ORAL DAILY
Qty: 90 | Refills: 0 | Status: ACTIVE | COMMUNITY

## 2023-11-01 RX ORDER — LISINOPRIL 5 MG/1
5 TABLET ORAL DAILY
Qty: 90 | Refills: 3 | Status: DISCONTINUED | COMMUNITY
Start: 2022-04-12 | End: 2023-11-01

## 2023-11-07 NOTE — ED ADULT NURSE NOTE - NURSING NEURO ORIENTATION
"Subjective   Tong Farnsworth is a 7 y.o.   male. 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     He was last seen 6/24/23 -   Parents are wondering about seiuzres, having staring eventes and will appear to be looking around  Has some quiet periods and will seem \"calm\" and this is somewhat suspicious to them for though these periods can last a few days.     Frequency of staring events is unclear, though school has noted it ~ 1 x per week.     He has gained weight and his formula is being reduced.   Gabapentin has helped with self harm, may be contributing to his \"spaciness\"     Current meds -   Gabapentin 7 ml AM/ noon, PM  Valproic acid 7 ml AM/PM  Onfi 4.5 ml BID  Baclofen 20 mg TID  - Dr Espitia                  Objective   Neurological ExamGen: Appropriate size for age.  Head: Plagiocephalic. microcephaly. Anterior fontanelle closed  Eyes: Non-injected  CV: RRR  Resp: CTA Bilaterally.  Abd: NT/ND, no organomegaly  Neuro:  MS: awake, alert, no persistent self harm behaviors.    CN II: PERRL, reacts to visual stimuli  CN III, VI, IV: EOMI  CN V: reacts to facial touch.  CN VII: No facial weakness  CN IX, X: Few sounds made.   Motor. limited strength testing, moves all extremities anti gravity,  increased tone tone particularly in ankles and hamstrings, arms and legs. Normal muscle bulk.  Sensory: reacts to touch..  Reflex: 2+ reflexes in knees and ankles bilaterally.Toes downgoing bilaterally.   Gait. Not observed to crawl or walk sitting in chair stroller      Physical Exam  I personally reviewed laboratory, radiographic, and medical studies which were pertinent for Tong.    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 Doing well, no clear seizures though some staring events of unclear cause, Continue to monitor    Continue meds without change for now, will check labs - and consider increase in depakote if level low.     Continue 1:1 supervision in bathtubs and pools.  Call with any concern for seizures or side effects.    Epilepsy nurses: Vanesa Sutherland, Lianne Arevalo, and Raisa Stout.   They can be reached at [phone] or at [email or web address]    Follow up in 5-6 months.     oriented to person, place and time

## 2023-12-05 ENCOUNTER — APPOINTMENT (OUTPATIENT)
Dept: CARDIOLOGY | Facility: CLINIC | Age: 53
End: 2023-12-05
Payer: COMMERCIAL

## 2023-12-05 PROCEDURE — A9500: CPT

## 2023-12-05 PROCEDURE — 78452 HT MUSCLE IMAGE SPECT MULT: CPT

## 2023-12-05 PROCEDURE — 93015 CV STRESS TEST SUPVJ I&R: CPT

## 2023-12-06 ENCOUNTER — APPOINTMENT (OUTPATIENT)
Dept: CARDIOLOGY | Facility: CLINIC | Age: 53
End: 2023-12-06

## 2023-12-12 ENCOUNTER — APPOINTMENT (OUTPATIENT)
Dept: CARDIOLOGY | Facility: CLINIC | Age: 53
End: 2023-12-12
Payer: COMMERCIAL

## 2023-12-12 PROCEDURE — 93306 TTE W/DOPPLER COMPLETE: CPT

## 2023-12-13 NOTE — PATIENT PROFILE ADULT. - NS PRO OT REFERRAL QUES 1 YN
Health Maintenance Due   Topic Date Due    Chlamydia and Gonorrhea Screening (if sexually active)  Never done    Influenza Vaccine (1) 09/01/2023    COVID-19 Vaccine (4 - 2023-24 season) 09/01/2023       Patient is due for topics as listed above but is not proceeding with Immunization(s) COVID-19 and Influenza and Chlamydia and Gonorrhea Screening at this time. Education provided for Immunization(s) COVID-19 and Influenza and Chlamydia and Gonorrhea Screening.      Preferred method of results communication:   Cell Phone:   Telephone Information:   Mobile [phone]     Okay to leave a message containing results? Yes             no

## 2023-12-19 ENCOUNTER — APPOINTMENT (OUTPATIENT)
Dept: CARDIOLOGY | Facility: CLINIC | Age: 53
End: 2023-12-19

## 2023-12-20 ENCOUNTER — APPOINTMENT (OUTPATIENT)
Dept: CARDIOLOGY | Facility: CLINIC | Age: 53
End: 2023-12-20

## 2024-01-16 ENCOUNTER — APPOINTMENT (OUTPATIENT)
Dept: CARDIOLOGY | Facility: CLINIC | Age: 54
End: 2024-01-16
Payer: COMMERCIAL

## 2024-01-16 VITALS
HEIGHT: 75 IN | BODY MASS INDEX: 35.93 KG/M2 | WEIGHT: 289 LBS | DIASTOLIC BLOOD PRESSURE: 70 MMHG | SYSTOLIC BLOOD PRESSURE: 120 MMHG | HEART RATE: 68 BPM | RESPIRATION RATE: 14 BRPM

## 2024-01-16 DIAGNOSIS — R94.39 ABNORMAL RESULT OF OTHER CARDIOVASCULAR FUNCTION STUDY: ICD-10-CM

## 2024-01-16 PROCEDURE — 99214 OFFICE O/P EST MOD 30 MIN: CPT | Mod: 25

## 2024-01-16 PROCEDURE — 93000 ELECTROCARDIOGRAM COMPLETE: CPT

## 2024-01-16 NOTE — REVIEW OF SYSTEMS
[Weight Gain (___ Lbs)] : [unfilled] ~Ulb weight gain [Dyspnea on exertion] : dyspnea during exertion [Chest Discomfort] : no chest discomfort [Palpitations] : no palpitations [Orthopnea] : no orthopnea [Negative] : Heme/Lymph

## 2024-01-16 NOTE — ASSESSMENT
[FreeTextEntry1] : EKG: SR at 68 bpm, LAFB. No significant ST or T wave abnormalities.   ECHOCARDIOGRAM 12/12/2023: LV cavity is moderately dilated.  LVEF 60% Borderline LVH Severe LV diastolic dysfunction Trace MR Trace TR  NUCLEAR PHARMACOLOGICAL STRESS TEST 12/5/23: -Small-sized, moderate to severe defect in the apex and apical lateral walls that are fixed consistent with an infarction.  -Small-sized, moderate defect(s) in the basal inferior, basal inferoseptal and mid inferior walls that are partially reversible consistent with ischemia. Compared to prior study done in 2018, apical infarction and inferior ischemia are new  ECHOCARDIOGRAM 9/10/20: Technically difficult study Area of apical and apical posterior wall hypokinesis with an ejection fraction that is low normal to mildly reduced 50%. Comparison to prior study suggest that the ejection fraction is lower.  ECHOCARDIOGRAM 2018: LVEF 60% Normal LV systolic function Normal RV size and function Mildly increased LV internal cavity size Trace MR  PHARMACOLOGICAL NUCLEAR STRESS TEST 2018: Normal SPECT imaging with artifact  IMPRESSION: 53-year-old male with hx of HTN, DM, HLD and non-Q MI at age 28 here for cardiac follow-up.   1. Nuclear stress testing concerning for ischemia versus attenuation artifact. He has no anginal symptoms however does not exercise regularly. Has EMANUEL for over a year when going up 2 flights of stairs. May be due to weight gain. EKG today shows SR and no acute ischemic changes.   2. HTN well-controlled on current regimen. 3. Lipids reasonably controlled.   PLAN:  1. Cardiac CTA to better evaluate for any obstructive CAD.  2. Continue current antihypertensives.  3. Continue statin therapy.  4. Strongly encouraged to adapt dietary changes to lose weight and lower HgbA1C.  5. Pt advised to exercise for at least 30 minutes most days of the week. Any cardiac symptoms such as chest pain, palpitations or new shortness of breath should be reported.  EKG obtained to assist in diagnosis and management of assessed problem(s).  Clinical follow-up in 2 months or sooner if needed.

## 2024-01-16 NOTE — HISTORY OF PRESENT ILLNESS
[FreeTextEntry1] : Patient denies any new cardiac symptoms. Works for the [employer] and walks frequently during work. Works about 16 hours a day, 5 days a week. He does not exercise regularly. Has regained over 40 lbs compared to 2021. Admits to not watching his diet carefully.   Reports EMANUEL after going up 2 flights of stairs which has been unchanged in over a year.  He denies any chest pain, dizziness, syncope, near-syncope, edema, orthopnea or PND.

## 2024-01-16 NOTE — REASON FOR VISIT
[FreeTextEntry1] : PARESH PICHARDO is a 53 year-old  M presents here for cardiac follow-up. His medical history includes: 1. Cardiomegaly 2. Hypertension 3. Diabetes 4. Hyperlipidemia 5. A small non-Q-wave myocardial infarction at age 28 6. SALVADOR 7. Bariatric surgery 8. bilateral hip replacement in 2014 which were tolerated well

## 2024-02-01 RX ORDER — KIT FOR THE PREPARATION OF TECHNETIUM TC99M SESTAMIBI 1 MG/5ML
INJECTION, POWDER, LYOPHILIZED, FOR SOLUTION PARENTERAL
Refills: 0 | Status: COMPLETED | OUTPATIENT
Start: 2024-02-01

## 2024-02-01 RX ADMIN — KIT FOR THE PREPARATION OF TECHNETIUM TC99M SESTAMIBI 0: 1 INJECTION, POWDER, LYOPHILIZED, FOR SOLUTION PARENTERAL at 00:00

## 2024-03-08 ENCOUNTER — APPOINTMENT (OUTPATIENT)
Dept: CT IMAGING | Facility: CLINIC | Age: 54
End: 2024-03-08
Payer: COMMERCIAL

## 2024-03-08 PROCEDURE — 75574 CT ANGIO HRT W/3D IMAGE: CPT

## 2024-03-13 ENCOUNTER — APPOINTMENT (OUTPATIENT)
Dept: CARDIOLOGY | Facility: CLINIC | Age: 54
End: 2024-03-13
Payer: COMMERCIAL

## 2024-03-13 VITALS
HEART RATE: 60 BPM | RESPIRATION RATE: 16 BRPM | SYSTOLIC BLOOD PRESSURE: 102 MMHG | BODY MASS INDEX: 32.58 KG/M2 | OXYGEN SATURATION: 98 % | WEIGHT: 262 LBS | HEIGHT: 75 IN | DIASTOLIC BLOOD PRESSURE: 70 MMHG

## 2024-03-13 DIAGNOSIS — E11.9 TYPE 2 DIABETES MELLITUS W/OUT COMPLICATIONS: ICD-10-CM

## 2024-03-13 DIAGNOSIS — I10 ESSENTIAL (PRIMARY) HYPERTENSION: ICD-10-CM

## 2024-03-13 DIAGNOSIS — G47.33 OBSTRUCTIVE SLEEP APNEA (ADULT) (PEDIATRIC): ICD-10-CM

## 2024-03-13 DIAGNOSIS — I34.0 NONRHEUMATIC MITRAL (VALVE) INSUFFICIENCY: ICD-10-CM

## 2024-03-13 DIAGNOSIS — E78.5 HYPERLIPIDEMIA, UNSPECIFIED: ICD-10-CM

## 2024-03-13 PROCEDURE — G2211 COMPLEX E/M VISIT ADD ON: CPT

## 2024-03-13 PROCEDURE — 99214 OFFICE O/P EST MOD 30 MIN: CPT

## 2024-03-13 PROCEDURE — 93000 ELECTROCARDIOGRAM COMPLETE: CPT

## 2024-03-13 RX ORDER — METFORMIN HYDROCHLORIDE 1000 MG/1
1000 TABLET, COATED ORAL
Qty: 90 | Refills: 3 | Status: ACTIVE | COMMUNITY

## 2024-03-13 NOTE — REASON FOR VISIT
[FreeTextEntry1] : Patient is a 53 -year-old male presenting for cardiac re-evaluation  His history includes that of: 1. Cardiomegaly 2. Hypertension 3. Diabetes 4. Hyperlipidemia 5. A small non-Q-wave myocardial infarction at age 28 6. SALVADOR 7. Bariatric surgery

## 2024-03-13 NOTE — PHYSICAL EXAM
[General Appearance - Well Developed] : well developed [Normal Conjunctiva] : the conjunctiva exhibited no abnormalities [Eyelids - No Xanthelasma] : the eyelids demonstrated no xanthelasmas [No Oral Pallor] : no oral pallor [Normal Oral Mucosa] : normal oral mucosa [No Oral Cyanosis] : no oral cyanosis [Normal Jugular Venous A Waves Present] : normal jugular venous A waves present [Normal Jugular Venous V Waves Present] : normal jugular venous V waves present [No Jugular Venous Bryan A Waves] : no jugular venous bryan A waves [Respiration, Rhythm And Depth] : normal respiratory rhythm and effort [Exaggerated Use Of Accessory Muscles For Inspiration] : no accessory muscle use [Auscultation Breath Sounds / Voice Sounds] : lungs were clear to auscultation bilaterally [Heart Rate And Rhythm] : heart rate and rhythm were normal [Heart Sounds] : normal S1 and S2 [Murmurs] : no murmurs present [Abdomen Soft] : soft [Abdomen Tenderness] : non-tender [Abdomen Mass (___ Cm)] : no abdominal mass palpated [Abnormal Walk] : normal gait [Nail Clubbing] : no clubbing of the fingernails [Cyanosis, Localized] : no localized cyanosis [Gait - Sufficient For Exercise Testing] : the gait was sufficient for exercise testing [Petechial Hemorrhages (___cm)] : no petechial hemorrhages [Skin Color & Pigmentation] : normal skin color and pigmentation [No Venous Stasis] : no venous stasis [] : no rash [Skin Lesions] : no skin lesions [No Skin Ulcers] : no skin ulcer [Affect] : the affect was normal [Oriented To Time, Place, And Person] : oriented to person, place, and time [No Xanthoma] : no  xanthoma was observed [No Anxiety] : not feeling anxious [Mood] : the mood was normal [FreeTextEntry1] : obese in no apparent distress

## 2024-03-13 NOTE — HISTORY OF PRESENT ILLNESS
[FreeTextEntry1] : Hx  left and right total hip replacement in 2014 which were well tolerated.  He has not had any significant cardiac symptoms Patient denies any chest pain, shortness of breath, palpitations, PND, orthopnea or edema . No other new intercurrent medical problems.  Compliant with mediations, CPAP used nightly  No exertional discomfort with physical activity  Admits to working very lengthy hours and sleep Is very limited. No longer exercises and diet has been suboptimal. After several months of substantial weight gain, he has been steadily losing weight since his last visit. Headaches and elevated blood pressure improved with increase of amlodipine from 5-10 and lisinopril to 10 mg daily.  Based on the results of an abnormality on his echocardiogram (apical hypokinesis and abnormal perfusion on his nuclear stress test, a cardiac CTA was obtained and will be reviewed here today.

## 2024-03-13 NOTE — ASSESSMENT
[FreeTextEntry1] : ECG: Normal sinus rhythm at 60.  First-degree AV block.  Left anterior fascicular block.  Poor r wave progression.  Nonspecific intraventricular conduction delay.  Lab data -------7/9/19---1/9/20---3/9/21---5/3/21--8/21/23--1/5/24 Chol--129------95--------82--------94--------93---------105 HDL---38-----30---------30--------36---------34----------33 LDL----74-----49---------39--------50--------45-----------61 Creat--0.66 HGB---13.9 o1f---6.5 6.1 5.2  Cardiac CTA 3/8/2024: CAC 2923 LMCA: 0 LAD: 386 Circ: 1400 RCA: 1137 CTA: Diffuse calcific plaquing without any high-grade stenosis.  Echocardiogram 12/12/2023 Left ventricular enlargement.  LVEF 60% Discrete apical akinesis with small areas of apical septal and apical inferior hypokinesis. Diastolic filling abnormality  Pharmacologic sestamibi Small moderately severe apical and apical lateral defect fixed consistent with infarction.  Basal inferior inferoseptal defect is reversible consistent with ischemia LVEF 49%  Echocardiogram 9/10/20: Technically difficult study Area of apical and apical posterior wall hypokinesis with an ejection fraction that is low normal to mildly reduced 50%. Comparison to prior study suggest that the ejection fraction is lower.  Impression: -Patient with a hx of multiple cardiac risk factors including ;Hypertension, diabetes and hyperlipidemia. Total CAC 2923 consistent with high risk Cardiac CTA, demonstrating mild diffuse nonobstructive calcific atherosclerosis.  With a very aggressive weight loss regimen, he has dropped substantial amounts of weight.  Subsequently developed an episode of orthostatic dizziness. His A1c is come down into normal range. He feels substantially improved.  - He has a history of cardiomegaly with 2021 echocardiogram showing a reduction in the left ventricular ejection fraction. Possibly, because of the use of Definity on this study, we are just seeing better endocardial definition and this is revealing an ejection fraction reduction that had  been previously reduced.  - Lipids well controlled and tolerating Atorvastatin  - CPAP used nightly, recently received a new machine, managed through [facility] sleep study in [place]  - No anginal symptoms. Physically active without any exertional discomfort.  - DM seem to have resolved.  Plan 1.  Continue current dosing of amlodipine and lisinopril. Monitor BP for 2 weeks and presents us with the chart.  2.  Encouraged the patient to follow-up regularly.  3. Continue his aggressive exercise regimen exercise.  4, Instructed the patient about the benefits of a diet that restricts portion sizes, increases frequency of meals and consists of vegetables, (more green and leafy), fruit and nuts, whole grains, lean proteins and limits carbohydrates and meat and dairy fats  5. Continued use of CPAP until he gets this reassessed. Potentially his sleep apnea is much improved. A repeat sleep study should be considered. Instructed to call with any cardiac associated symptoms.  Clinical follow up in 6 months.

## 2024-06-19 NOTE — PHYSICAL THERAPY INITIAL EVALUATION ADULT - IMPAIRMENTS FOUND, PT EVAL
Mild. 5.4  EKG w/o changes   Received 5u regular insulin and albuterol  Hold potassium supplement and aldactone for today  Repeat potassium level   joint integrity and mobility/ROM/aerobic capacity/endurance/arousal, attention, and cognition/gait, locomotion, and balance/gross motor/neuromotor development and sensory integration/muscle strength/posture

## 2024-09-04 ENCOUNTER — APPOINTMENT (OUTPATIENT)
Dept: CARDIOLOGY | Facility: CLINIC | Age: 54
End: 2024-09-04
Payer: COMMERCIAL

## 2024-09-04 VITALS
OXYGEN SATURATION: 98 % | WEIGHT: 253 LBS | DIASTOLIC BLOOD PRESSURE: 90 MMHG | RESPIRATION RATE: 16 BRPM | BODY MASS INDEX: 31.46 KG/M2 | HEART RATE: 68 BPM | HEIGHT: 75 IN | SYSTOLIC BLOOD PRESSURE: 132 MMHG

## 2024-09-04 DIAGNOSIS — I25.10 ATHEROSCLEROTIC HEART DISEASE OF NATIVE CORONARY ARTERY W/OUT ANGINA PECTORIS: ICD-10-CM

## 2024-09-04 DIAGNOSIS — R94.39 ABNORMAL RESULT OF OTHER CARDIOVASCULAR FUNCTION STUDY: ICD-10-CM

## 2024-09-04 DIAGNOSIS — E78.5 HYPERLIPIDEMIA, UNSPECIFIED: ICD-10-CM

## 2024-09-04 DIAGNOSIS — I10 ESSENTIAL (PRIMARY) HYPERTENSION: ICD-10-CM

## 2024-09-04 DIAGNOSIS — R93.1 ABNORMAL FINDINGS ON DIAGNOSTIC IMAGING OF HEART AND CORONARY CIRCULATION: ICD-10-CM

## 2024-09-04 DIAGNOSIS — E11.9 TYPE 2 DIABETES MELLITUS W/OUT COMPLICATIONS: ICD-10-CM

## 2024-09-04 DIAGNOSIS — G47.33 OBSTRUCTIVE SLEEP APNEA (ADULT) (PEDIATRIC): ICD-10-CM

## 2024-09-04 DIAGNOSIS — I34.0 NONRHEUMATIC MITRAL (VALVE) INSUFFICIENCY: ICD-10-CM

## 2024-09-04 PROCEDURE — G2211 COMPLEX E/M VISIT ADD ON: CPT

## 2024-09-04 PROCEDURE — 99214 OFFICE O/P EST MOD 30 MIN: CPT

## 2024-09-04 PROCEDURE — 93000 ELECTROCARDIOGRAM COMPLETE: CPT

## 2024-09-04 RX ORDER — HYDROCHLOROTHIAZIDE 12.5 MG/1
12.5 CAPSULE ORAL
Qty: 90 | Refills: 3 | Status: ACTIVE | COMMUNITY
Start: 2024-09-04 | End: 1900-01-01

## 2024-09-04 RX ORDER — SEMAGLUTIDE 1.34 MG/ML
4 INJECTION, SOLUTION SUBCUTANEOUS WEEKLY
Qty: 1 | Refills: 0 | Status: ACTIVE | COMMUNITY
Start: 2024-09-04

## 2024-09-04 NOTE — ASSESSMENT
[FreeTextEntry1] : ECG: Normal sinus rhythm at 68.  First-degree AV block.  Left anterior fascicular block.  Poor r wave progression.  Nonspecific intraventricular conduction delay.  Lab data -------7/9/19---1/9/20---3/9/21---5/3/21--8/21/23--1/5/24--7/12/24 Chol--129------95--------82--------94--------93---------105-----115 HDL---38-----30---------30--------36---------34----------33------41 LDL----74-----49---------39--------50--------45-----------61-----59 Creat--0.66 HGB---13.9 s9n---1.5--- 6.1---------5.2  Cardiac CTA 3/8/2024: CAC 2923 LMCA: 0 LAD: 386 Circ: 1400 RCA: 1137 CTA---------Diffuse calcific plaquing without any high-grade stenosis.  Echocardiogram 12/12/2023 Left ventricular enlargement.  LVEF 60% Discrete apical akinesis with small areas of apical septal and apical inferior hypokinesis. Diastolic filling abnormality  12/5/23 Pharmacologic sestamibi Small moderately severe apical and apical lateral defect fixed consistent with infarction.  Basal inferior inferoseptal defect is reversible consistent with ischemia LVEF 49%  Echocardiogram 9/10/20: Technically difficult study Area of apical and apical posterior wall hypokinesis with an ejection fraction that is low normal to mildly reduced 50%. Comparison to prior study suggest that the ejection fraction is lower.  Impression: -Patient with a hx of multiple cardiac risk factors including ;Hypertension, diabetes and hyperlipidemia. Total CAC 2923 consistent with high risk Cardiac CTA, demonstrating mild diffuse nonobstructive calcific atherosclerosis.  With a very aggressive weight loss regimen, he has dropped substantial amounts of weight. His A1c has come into a normal range. He feels substantially improved.  - He has a history of cardiomegaly with 2021 echocardiogram showing a reduction in the left ventricular ejection fraction. Possibly, because of the use of Definity on this study, we are just seeing better endocardial definition and this is revealing an ejection fraction reduction that had  been previously reduced.  - Lipids well controlled and tolerating Atorvastatin  - CPAP used nightly, recently received a new machine, managed through [facility] sleep study in [place]  - No anginal symptoms. Physically active without any exertional discomfort.  - DM seem to have resolved.  - HTN: Diastolic blood pressure remains elevated despite continued weight loss, compliance with treatment of sleep apnea and avoidance of sodium.  Plan 1.  Continue current dosing of amlodipine and lisinopril.  Add hydrochlorothiazide 12.5 mg a day.      Patient to call me with blood pressures in 3 weeks and obtain a BMP before that.  2.  Encouraged the patient to follow-up regularly.  Likely another stress test in 6 months.  3. Continue his aggressive exercise regimen exercise.  4, Instructed the patient about the benefits of a diet that restricts portion sizes, increases frequency of meals and consists of vegetables, (more green and leafy), fruit and nuts, whole grains, lean proteins and limits carbohydrates and meat and dairy fats  5. Continued use of CPAP until he gets this reassessed. Potentially his sleep apnea is much improved. A repeat sleep study should be considered. Instructed to call with any cardiac associated symptoms.  Clinical follow up in 3 months.

## 2024-09-04 NOTE — HISTORY OF PRESENT ILLNESS
[FreeTextEntry1] : Hx  left and right total hip replacement in 2014 which were well tolerated.  He has not had any significant cardiac symptoms Patient denies any chest pain, shortness of breath, palpitations, PND, orthopnea or edema . No other new intercurrent medical problems.  Compliant with mediations, CPAP used nightly  No exertional discomfort with physical activity  Admits to working very lengthy hours and sleep Is very limited. No longer exercises and diet has been suboptimal. After several months of substantial weight gain, he has been steadily losing weight since his last visit. Now taking Ozempic with further weight loss. Headaches and elevated blood pressure improved with increase of amlodipine from 5-10 and lisinopril to 10 mg daily. Notes that diastolic blood pressures continue to be high 80s to 90.  Based on the results of an abnormality on his echocardiogram (apical hypokinesis and abnormal perfusion on his nuclear stress test, a cardiac CTA was obtained demonstrating calcium score nearly 3000 with diffuse nonobstructive disease.

## 2024-09-04 NOTE — ASSESSMENT
[FreeTextEntry1] : ECG: Normal sinus rhythm at 68.  First-degree AV block.  Left anterior fascicular block.  Poor r wave progression.  Nonspecific intraventricular conduction delay.  Lab data -------7/9/19---1/9/20---3/9/21---5/3/21--8/21/23--1/5/24--7/12/24 Chol--129------95--------82--------94--------93---------105-----115 HDL---38-----30---------30--------36---------34----------33------41 LDL----74-----49---------39--------50--------45-----------61-----59 Creat--0.66 HGB---13.9 d3k---2.5--- 6.1---------5.2  Cardiac CTA 3/8/2024: CAC 2923 LMCA: 0 LAD: 386 Circ: 1400 RCA: 1137 CTA---------Diffuse calcific plaquing without any high-grade stenosis.  Echocardiogram 12/12/2023 Left ventricular enlargement.  LVEF 60% Discrete apical akinesis with small areas of apical septal and apical inferior hypokinesis. Diastolic filling abnormality  12/5/23 Pharmacologic sestamibi Small moderately severe apical and apical lateral defect fixed consistent with infarction.  Basal inferior inferoseptal defect is reversible consistent with ischemia LVEF 49%  Echocardiogram 9/10/20: Technically difficult study Area of apical and apical posterior wall hypokinesis with an ejection fraction that is low normal to mildly reduced 50%. Comparison to prior study suggest that the ejection fraction is lower.  Impression: -Patient with a hx of multiple cardiac risk factors including ;Hypertension, diabetes and hyperlipidemia. Total CAC 2923 consistent with high risk Cardiac CTA, demonstrating mild diffuse nonobstructive calcific atherosclerosis.  With a very aggressive weight loss regimen, he has dropped substantial amounts of weight. His A1c has come into a normal range. He feels substantially improved.  - He has a history of cardiomegaly with 2021 echocardiogram showing a reduction in the left ventricular ejection fraction. Possibly, because of the use of Definity on this study, we are just seeing better endocardial definition and this is revealing an ejection fraction reduction that had  been previously reduced.  - Lipids well controlled and tolerating Atorvastatin  - CPAP used nightly, recently received a new machine, managed through [facility] study in [place]  - No anginal symptoms. Physically active without any exertional discomfort.  - DM seem to have resolved.  - HTN: Diastolic blood pressure remains elevated despite continued weight loss, compliance with treatment of sleep apnea and avoidance of sodium.  Plan 1.  Continue current dosing of amlodipine and lisinopril.  Add hydrochlorothiazide 12.5 mg a day.      Patient to call me with blood pressures in 3 weeks and obtain a BMP before that.  2.  Encouraged the patient to follow-up regularly.  Likely another stress test in 6 months.  3. Continue his aggressive exercise regimen exercise.  4, Instructed the patient about the benefits of a diet that restricts portion sizes, increases frequency of meals and consists of vegetables, (more green and leafy), fruit and nuts, whole grains, lean proteins and limits carbohydrates and meat and dairy fats  5. Continued use of CPAP until he gets this reassessed. Potentially his sleep apnea is much improved. A repeat sleep study should be considered. Instructed to call with any cardiac associated symptoms.  Clinical follow up in 3 months.

## 2024-09-22 ENCOUNTER — NON-APPOINTMENT (OUTPATIENT)
Age: 54
End: 2024-09-22

## 2024-09-23 LAB — A1CG - A1C WITH ESTIMATED AVERAGE GLUCOSE: 5.3

## 2024-11-25 ENCOUNTER — NON-APPOINTMENT (OUTPATIENT)
Age: 54
End: 2024-11-25

## 2024-12-10 ENCOUNTER — APPOINTMENT (OUTPATIENT)
Dept: CARDIOLOGY | Facility: CLINIC | Age: 54
End: 2024-12-10
Payer: COMMERCIAL

## 2024-12-10 VITALS
WEIGHT: 249 LBS | HEIGHT: 75 IN | BODY MASS INDEX: 30.96 KG/M2 | DIASTOLIC BLOOD PRESSURE: 80 MMHG | SYSTOLIC BLOOD PRESSURE: 122 MMHG

## 2024-12-10 DIAGNOSIS — I34.0 NONRHEUMATIC MITRAL (VALVE) INSUFFICIENCY: ICD-10-CM

## 2024-12-10 DIAGNOSIS — G47.33 OBSTRUCTIVE SLEEP APNEA (ADULT) (PEDIATRIC): ICD-10-CM

## 2024-12-10 DIAGNOSIS — R94.31 ABNORMAL ELECTROCARDIOGRAM [ECG] [EKG]: ICD-10-CM

## 2024-12-10 DIAGNOSIS — I10 ESSENTIAL (PRIMARY) HYPERTENSION: ICD-10-CM

## 2024-12-10 DIAGNOSIS — I25.10 ATHEROSCLEROTIC HEART DISEASE OF NATIVE CORONARY ARTERY W/OUT ANGINA PECTORIS: ICD-10-CM

## 2024-12-10 DIAGNOSIS — R93.1 ABNORMAL FINDINGS ON DIAGNOSTIC IMAGING OF HEART AND CORONARY CIRCULATION: ICD-10-CM

## 2024-12-10 DIAGNOSIS — R55 SYNCOPE AND COLLAPSE: ICD-10-CM

## 2024-12-10 DIAGNOSIS — E78.5 HYPERLIPIDEMIA, UNSPECIFIED: ICD-10-CM

## 2024-12-10 PROCEDURE — 99214 OFFICE O/P EST MOD 30 MIN: CPT

## 2024-12-10 PROCEDURE — 93000 ELECTROCARDIOGRAM COMPLETE: CPT

## 2024-12-10 PROCEDURE — G2211 COMPLEX E/M VISIT ADD ON: CPT | Mod: NC

## 2025-01-02 ENCOUNTER — APPOINTMENT (OUTPATIENT)
Dept: CARDIOLOGY | Facility: CLINIC | Age: 55
End: 2025-01-02

## 2025-03-15 ENCOUNTER — NON-APPOINTMENT (OUTPATIENT)
Age: 55
End: 2025-03-15

## 2025-03-17 ENCOUNTER — APPOINTMENT (OUTPATIENT)
Dept: CARDIOLOGY | Facility: CLINIC | Age: 55
End: 2025-03-17
Payer: COMMERCIAL

## 2025-03-17 VITALS
DIASTOLIC BLOOD PRESSURE: 70 MMHG | WEIGHT: 245 LBS | RESPIRATION RATE: 16 BRPM | HEIGHT: 75 IN | OXYGEN SATURATION: 98 % | SYSTOLIC BLOOD PRESSURE: 102 MMHG | HEART RATE: 59 BPM | BODY MASS INDEX: 30.46 KG/M2

## 2025-03-17 DIAGNOSIS — I34.0 NONRHEUMATIC MITRAL (VALVE) INSUFFICIENCY: ICD-10-CM

## 2025-03-17 DIAGNOSIS — I51.7 CARDIOMEGALY: ICD-10-CM

## 2025-03-17 DIAGNOSIS — G47.33 OBSTRUCTIVE SLEEP APNEA (ADULT) (PEDIATRIC): ICD-10-CM

## 2025-03-17 DIAGNOSIS — R93.1 ABNORMAL FINDINGS ON DIAGNOSTIC IMAGING OF HEART AND CORONARY CIRCULATION: ICD-10-CM

## 2025-03-17 DIAGNOSIS — E78.5 HYPERLIPIDEMIA, UNSPECIFIED: ICD-10-CM

## 2025-03-17 DIAGNOSIS — I25.10 ATHEROSCLEROTIC HEART DISEASE OF NATIVE CORONARY ARTERY W/OUT ANGINA PECTORIS: ICD-10-CM

## 2025-03-17 DIAGNOSIS — I10 ESSENTIAL (PRIMARY) HYPERTENSION: ICD-10-CM

## 2025-03-17 DIAGNOSIS — E11.9 TYPE 2 DIABETES MELLITUS W/OUT COMPLICATIONS: ICD-10-CM

## 2025-03-17 PROCEDURE — 93000 ELECTROCARDIOGRAM COMPLETE: CPT

## 2025-03-17 PROCEDURE — G2211 COMPLEX E/M VISIT ADD ON: CPT | Mod: NC

## 2025-03-17 PROCEDURE — 99214 OFFICE O/P EST MOD 30 MIN: CPT

## 2025-04-04 ENCOUNTER — NON-APPOINTMENT (OUTPATIENT)
Age: 55
End: 2025-04-04

## 2025-09-08 ENCOUNTER — APPOINTMENT (OUTPATIENT)
Dept: CARDIOLOGY | Facility: CLINIC | Age: 55
End: 2025-09-08
Payer: COMMERCIAL

## 2025-09-08 VITALS
SYSTOLIC BLOOD PRESSURE: 100 MMHG | WEIGHT: 244 LBS | HEIGHT: 75 IN | OXYGEN SATURATION: 98 % | DIASTOLIC BLOOD PRESSURE: 66 MMHG | RESPIRATION RATE: 16 BRPM | BODY MASS INDEX: 30.34 KG/M2 | HEART RATE: 63 BPM

## 2025-09-08 DIAGNOSIS — E78.5 HYPERLIPIDEMIA, UNSPECIFIED: ICD-10-CM

## 2025-09-08 DIAGNOSIS — G47.33 OBSTRUCTIVE SLEEP APNEA (ADULT) (PEDIATRIC): ICD-10-CM

## 2025-09-08 DIAGNOSIS — I10 ESSENTIAL (PRIMARY) HYPERTENSION: ICD-10-CM

## 2025-09-08 DIAGNOSIS — R55 SYNCOPE AND COLLAPSE: ICD-10-CM

## 2025-09-08 DIAGNOSIS — E11.9 TYPE 2 DIABETES MELLITUS W/OUT COMPLICATIONS: ICD-10-CM

## 2025-09-08 DIAGNOSIS — I34.0 NONRHEUMATIC MITRAL (VALVE) INSUFFICIENCY: ICD-10-CM

## 2025-09-08 PROCEDURE — 93000 ELECTROCARDIOGRAM COMPLETE: CPT

## 2025-09-08 PROCEDURE — G2211 COMPLEX E/M VISIT ADD ON: CPT | Mod: NC

## 2025-09-08 PROCEDURE — 99214 OFFICE O/P EST MOD 30 MIN: CPT

## 2025-09-17 ENCOUNTER — RX RENEWAL (OUTPATIENT)
Age: 55
End: 2025-09-17